# Patient Record
Sex: FEMALE
[De-identification: names, ages, dates, MRNs, and addresses within clinical notes are randomized per-mention and may not be internally consistent; named-entity substitution may affect disease eponyms.]

---

## 2018-10-16 ENCOUNTER — HOSPITAL (OUTPATIENT)
Dept: OTHER | Age: 38
End: 2018-10-16

## 2018-10-19 ENCOUNTER — HOSPITAL (OUTPATIENT)
Dept: OTHER | Age: 38
End: 2018-10-19

## 2021-07-03 ENCOUNTER — APPOINTMENT (OUTPATIENT)
Dept: GENERAL RADIOLOGY | Facility: CLINIC | Age: 41
End: 2021-07-03
Attending: EMERGENCY MEDICINE
Payer: COMMERCIAL

## 2021-07-03 ENCOUNTER — HOSPITAL ENCOUNTER (EMERGENCY)
Facility: CLINIC | Age: 41
Discharge: HOME | End: 2021-07-03
Attending: EMERGENCY MEDICINE | Admitting: EMERGENCY MEDICINE
Payer: COMMERCIAL

## 2021-07-03 ENCOUNTER — APPOINTMENT (OUTPATIENT)
Dept: CT IMAGING | Facility: CLINIC | Age: 41
End: 2021-07-03
Attending: EMERGENCY MEDICINE
Payer: COMMERCIAL

## 2021-07-03 VITALS
TEMPERATURE: 97.9 F | HEART RATE: 74 BPM | RESPIRATION RATE: 18 BRPM | DIASTOLIC BLOOD PRESSURE: 79 MMHG | OXYGEN SATURATION: 97 % | SYSTOLIC BLOOD PRESSURE: 128 MMHG

## 2021-07-03 DIAGNOSIS — R51.9 ACUTE NONINTRACTABLE HEADACHE, UNSPECIFIED HEADACHE TYPE: ICD-10-CM

## 2021-07-03 LAB
ANION GAP SERPL CALCULATED.3IONS-SCNC: 3 MMOL/L (ref 3–14)
B-HCG FREE SERPL-ACNC: <5 IU/L
BASOPHILS # BLD AUTO: 0 10E9/L (ref 0–0.2)
BASOPHILS NFR BLD AUTO: 0.4 %
BUN SERPL-MCNC: 14 MG/DL (ref 7–30)
CALCIUM SERPL-MCNC: 8.5 MG/DL (ref 8.5–10.1)
CHLORIDE SERPL-SCNC: 107 MMOL/L (ref 94–109)
CO2 SERPL-SCNC: 26 MMOL/L (ref 20–32)
CREAT SERPL-MCNC: 0.6 MG/DL (ref 0.52–1.04)
DIFFERENTIAL METHOD BLD: ABNORMAL
EOSINOPHIL # BLD AUTO: 0.1 10E9/L (ref 0–0.7)
EOSINOPHIL NFR BLD AUTO: 1.2 %
ERYTHROCYTE [DISTWIDTH] IN BLOOD BY AUTOMATED COUNT: 13.5 % (ref 10–15)
GFR SERPL CREATININE-BSD FRML MDRD: >90 ML/MIN/{1.73_M2}
GLUCOSE SERPL-MCNC: 126 MG/DL (ref 70–99)
HCT VFR BLD AUTO: 38.5 % (ref 35–47)
HGB BLD-MCNC: 11.8 G/DL (ref 11.7–15.7)
IMM GRANULOCYTES # BLD: 0 10E9/L (ref 0–0.4)
IMM GRANULOCYTES NFR BLD: 0.5 %
LYMPHOCYTES # BLD AUTO: 2.2 10E9/L (ref 0.8–5.3)
LYMPHOCYTES NFR BLD AUTO: 38.2 %
MCH RBC QN AUTO: 28.1 PG (ref 26.5–33)
MCHC RBC AUTO-ENTMCNC: 30.6 G/DL (ref 31.5–36.5)
MCV RBC AUTO: 92 FL (ref 78–100)
MONOCYTES # BLD AUTO: 0.5 10E9/L (ref 0–1.3)
MONOCYTES NFR BLD AUTO: 8.1 %
NEUTROPHILS # BLD AUTO: 3 10E9/L (ref 1.6–8.3)
NEUTROPHILS NFR BLD AUTO: 51.6 %
NRBC # BLD AUTO: 0 10*3/UL
NRBC BLD AUTO-RTO: 0 /100
PLATELET # BLD AUTO: 326 10E9/L (ref 150–450)
POTASSIUM SERPL-SCNC: 3.4 MMOL/L (ref 3.4–5.3)
RBC # BLD AUTO: 4.2 10E12/L (ref 3.8–5.2)
SODIUM SERPL-SCNC: 136 MMOL/L (ref 133–144)
TROPONIN I SERPL-MCNC: <0.015 UG/L (ref 0–0.04)
WBC # BLD AUTO: 5.7 10E9/L (ref 4–11)

## 2021-07-03 PROCEDURE — 258N000003 HC RX IP 258 OP 636: Performed by: EMERGENCY MEDICINE

## 2021-07-03 PROCEDURE — 96375 TX/PRO/DX INJ NEW DRUG ADDON: CPT

## 2021-07-03 PROCEDURE — 84484 ASSAY OF TROPONIN QUANT: CPT | Performed by: EMERGENCY MEDICINE

## 2021-07-03 PROCEDURE — 70450 CT HEAD/BRAIN W/O DYE: CPT

## 2021-07-03 PROCEDURE — 84702 CHORIONIC GONADOTROPIN TEST: CPT

## 2021-07-03 PROCEDURE — 250N000011 HC RX IP 250 OP 636: Performed by: EMERGENCY MEDICINE

## 2021-07-03 PROCEDURE — 85025 COMPLETE CBC W/AUTO DIFF WBC: CPT | Performed by: EMERGENCY MEDICINE

## 2021-07-03 PROCEDURE — 71046 X-RAY EXAM CHEST 2 VIEWS: CPT

## 2021-07-03 PROCEDURE — 93005 ELECTROCARDIOGRAM TRACING: CPT

## 2021-07-03 PROCEDURE — 96374 THER/PROPH/DIAG INJ IV PUSH: CPT

## 2021-07-03 PROCEDURE — 99285 EMERGENCY DEPT VISIT HI MDM: CPT | Mod: 25

## 2021-07-03 PROCEDURE — 80048 BASIC METABOLIC PNL TOTAL CA: CPT | Performed by: EMERGENCY MEDICINE

## 2021-07-03 PROCEDURE — 96361 HYDRATE IV INFUSION ADD-ON: CPT

## 2021-07-03 RX ORDER — DIPHENHYDRAMINE HYDROCHLORIDE 50 MG/ML
25 INJECTION INTRAMUSCULAR; INTRAVENOUS ONCE
Status: COMPLETED | OUTPATIENT
Start: 2021-07-03 | End: 2021-07-03

## 2021-07-03 RX ORDER — METOCLOPRAMIDE HYDROCHLORIDE 5 MG/ML
5 INJECTION INTRAMUSCULAR; INTRAVENOUS ONCE
Status: COMPLETED | OUTPATIENT
Start: 2021-07-03 | End: 2021-07-03

## 2021-07-03 RX ADMIN — METOCLOPRAMIDE HYDROCHLORIDE 5 MG: 5 INJECTION INTRAMUSCULAR; INTRAVENOUS at 04:17

## 2021-07-03 RX ADMIN — SODIUM CHLORIDE 1000 ML: 9 INJECTION, SOLUTION INTRAVENOUS at 04:16

## 2021-07-03 RX ADMIN — DIPHENHYDRAMINE HYDROCHLORIDE 25 MG: 50 INJECTION INTRAMUSCULAR; INTRAVENOUS at 04:16

## 2021-07-03 ASSESSMENT — ENCOUNTER SYMPTOMS
COUGH: 1
FEVER: 0
SHORTNESS OF BREATH: 1
NAUSEA: 0
DIARRHEA: 0
HEADACHES: 1
VOMITING: 0
PALPITATIONS: 1

## 2021-07-03 NOTE — ED TRIAGE NOTES
Gradual onset headache today.  No history of migraines.  Took 500 mg of tylenol 30 min PTA.  Also reports chest pressure and SOB.

## 2021-07-03 NOTE — ED PROVIDER NOTES
History   Chief Complaint:  Headache     The history is provided by the patient.      Javy Kiran is a 41 year old female who presents for evaluation of a headache. Yesterday evening, around 6 hours prior to arrival, she developed gradually worsening generalized headache. She has had similar headaches but has no history of migraines. She took 500 mg Tylenol about 4 hours prior to arrival and an addition 500 mg just before arrival. However, she continues to have 10/10 headache. When she was trying to fall asleep about 3 hours prior to arrival, she could hear her heartbeat in her ear on the left and subsequently developed shortness of breath and left upper chest pressure with palpitations, described as the sensation of a rapid heart rate. This constellation of symptoms prompted her concern but she remarks her chest pain has resolved upon evaluation in the ED. She reports an intermittent cough for several months but denies fever, nausea, vomiting, or diarrhea.     Javy admits she does not regularly drink water. She also remarks she had 5 children and does not sleep as much as she should.      Review of Systems   Constitutional: Negative for fever.   Respiratory: Positive for cough (chronic) and shortness of breath.    Cardiovascular: Positive for chest pain and palpitations.   Gastrointestinal: Negative for diarrhea, nausea and vomiting.   Neurological: Positive for headaches.   All other systems reviewed and are negative.    Allergies:  No known drug allergies      Medications:  Paragard      Past Medical History:    Hyperlipidemia    Lumbar hernia      Past Surgical History:     section   Lumbar discectomy      Family History:    Diabetes - Mother and sister   Hypertension - Mother and sister   Thyroid disease - Mother      Social History:  The patient presents to the ED accompanied by her brother.  She has five children including twin 2 year olds.      Physical Exam     Patient Vitals for the past 24  hrs:   BP Temp Temp src Pulse Resp SpO2   07/03/21 0600 -- -- -- -- -- 97 %   07/03/21 0545 128/79 -- -- 74 -- 97 %   07/03/21 0530 (!) 131/93 -- -- 88 -- 99 %   07/03/21 0515 129/89 -- -- 97 -- 98 %   07/03/21 0500 124/79 -- -- 82 -- 98 %   07/03/21 0455 -- -- -- -- -- 98 %   07/03/21 0450 -- -- -- -- -- 98 %   07/03/21 0430 -- -- -- 96 -- --   07/03/21 0425 -- -- -- 96 -- 100 %   07/03/21 0420 135/84 -- -- 100 -- --   07/03/21 0415 -- -- -- 93 -- 99 %   07/03/21 0410 -- -- -- 95 -- 98 %   07/03/21 0405 -- -- -- 90 -- 96 %   07/03/21 0400 -- -- -- 94 -- 97 %   07/03/21 0355 -- -- -- 96 -- --   07/03/21 0350 -- -- -- 108 -- --   07/03/21 0345 -- -- -- 96 -- --   07/03/21 0340 -- -- -- 95 -- --   07/03/21 0335 -- -- -- 105 -- --   07/03/21 0330 -- -- -- 105 -- 100 %   07/03/21 0325 -- -- -- -- -- 97 %   07/03/21 0320 (!) 171/114 -- -- 117 -- 100 %   07/03/21 0308 (!) 179/123 97.9  F (36.6  C) Temporal 92 18 99 %       Physical Exam  General: Well-developed and well-nourished. Uncomfortable appearing middle aged woman. Cooperative.  Head:  Atraumatic.  Eyes:  Conjunctivae, lids, and sclerae are normal.  ENT:    TMs clear bilaterally.  Neck:  Supple. Normal range of motion.  CV:  Regular rate and rhythm. Normal heart sounds with no murmurs, rubs, or gallops detected.  Resp:  No respiratory distress. Clear to auscultation bilaterally without decreased breath sounds, wheezing, rales, or rhonchi.  GI:  Soft. Non-distended. Non-tender.    MS:  Normal ROM. No bilateral lower extremity edema.  Skin:  Warm. Non-diaphoretic. No pallor.  Neuro:  Awake. A&Ox3. Normal strength including 5/5 strength with hand grasp.  Psych: Normal mood and affect. Normal speech.  Vitals reviewed.    Emergency Department Course   EKG  Indication: Chest Pain   Time: 3:27:57  Rate 107 bpm. CA interval 160 ms. QRS duration 68 ms. QT/QTc 318/424 ms.   Sinus tachycardia, Otherwise normal ECG    No acute ST changes.  No prior for  comparison.    Imaging:  XR Chest:  IMPRESSION: No evidence of active cardiopulmonary disease.   Per radiology.     CT Head w/o Contrast:  IMPRESSION:   1.  No acute intracranial process.  Per radiology.     Laboratory:   CBC: WBC 5.7, HGB 11.8,    BMP: Glucose 126 high, o/w WNL (Creatinine 0.60)   Troponin (Collected 0358): <0.015   ISTAT HCG Quantitative Pregnancy POCT: <5.0      Emergency Department Course:  Reviewed:  I reviewed nursing notes, vitals, and past medical history.    Assessments:  0325: I obtained history and examined the patient as noted above.     0457: I rechecked the patient. She was sleeping comfortably.      0615: I updated and reassessed the patient. She reported feeling lightheaded following the medication but her headache and chest pain have resolved.      Interventions:  0416 Benadryl 25 mg IV   0416 NS 1,000 mL IV   0417 Reglan 5 mg IV     Disposition:  The patient was discharged home.     Impression & Plan   Medical Decision Making:  Javy is a 41-year-old woman who had a diffuse headache starting six hours prior to arrival.  When she laid down for bed she could hear her heartbeat in her ear and then she developed shortness of breath, palpitations, and left-sided chest pain prompting her visit.  By the time of arrival she feels her chest symptoms are improved.  She appears somewhat uncomfortable, though overall well.  She has unremarkable vital signs and an unremarkable physical exam.  Her laboratory studies reveal no leukocytosis, anemia, kidney injury, or electrolyte derangements.  EKG is reassuring without acute ST changes or arrhythmias and troponin is undetectable.  I have very low suspicion for ACS in this woman.  I have some suspicion she may have had a panic attack.  Her chest x-ray does not reveal alternate cause for pain such as pneumonia or pneumothorax.  To practice with an abundance of caution I did send her for a head CT which reveals no intracranial hemorrhage or  other acute pathology.  She felt much better after IV fluids, Reglan, and Benadryl although she did feel somewhat lightheaded which is almost certainly related to these medications.  Because her chest symptoms and headache have significantly improved and her work-up here is unremarkable, she is appropriate for discharge.  I suspect her headache is related to poor oral hydration and lack of sleep and I encouraged her to increase rest and fluids.  I encouraged her to use Tylenol or ibuprofen as needed for pain at home and follow-up closely with primary care.  However, she does understand to return if she has worsening of symptoms or new concerns of any kind.  All questions answered.  Amenable to discharge.    Diagnosis:    ICD-10-CM   1. Acute nonintractable headache, unspecified headache type  R51.9        Scribe Disclosure:  I, Crow Leong, am serving as a scribe at 3:25 AM on 7/3/2021 to document services personally performed by Linn Perez MD based on my observations and the provider's statements to me.         Linn Perez MD  07/03/21 1800

## 2021-07-03 NOTE — DISCHARGE INSTRUCTIONS
Lots of fluids and rest.  Tylenol or ibuprofen as needed for pain.  Return with worsening symptoms or new concerns.  Follow up with primary care to ensure you are improving.

## 2021-07-05 LAB — INTERPRETATION ECG - MUSE: NORMAL

## 2022-12-22 ENCOUNTER — HOSPITAL ENCOUNTER (EMERGENCY)
Facility: CLINIC | Age: 42
Discharge: HOME | End: 2022-12-23
Attending: EMERGENCY MEDICINE | Admitting: EMERGENCY MEDICINE
Payer: COMMERCIAL

## 2022-12-22 ENCOUNTER — APPOINTMENT (OUTPATIENT)
Dept: CT IMAGING | Facility: CLINIC | Age: 42
End: 2022-12-22
Attending: EMERGENCY MEDICINE
Payer: COMMERCIAL

## 2022-12-22 VITALS
HEART RATE: 65 BPM | SYSTOLIC BLOOD PRESSURE: 143 MMHG | TEMPERATURE: 98 F | DIASTOLIC BLOOD PRESSURE: 85 MMHG | RESPIRATION RATE: 18 BRPM | OXYGEN SATURATION: 99 %

## 2022-12-22 DIAGNOSIS — R51.9 ACUTE INTRACTABLE HEADACHE, UNSPECIFIED HEADACHE TYPE: ICD-10-CM

## 2022-12-22 LAB
ANION GAP SERPL CALCULATED.3IONS-SCNC: 8 MMOL/L (ref 7–15)
BASOPHILS # BLD AUTO: 0 10E3/UL (ref 0–0.2)
BASOPHILS NFR BLD AUTO: 1 %
BUN SERPL-MCNC: 11.6 MG/DL (ref 6–20)
CALCIUM SERPL-MCNC: 9.2 MG/DL (ref 8.6–10)
CHLORIDE SERPL-SCNC: 103 MMOL/L (ref 98–107)
CREAT SERPL-MCNC: 0.52 MG/DL (ref 0.51–0.95)
DEPRECATED HCO3 PLAS-SCNC: 25 MMOL/L (ref 22–29)
EOSINOPHIL # BLD AUTO: 0.2 10E3/UL (ref 0–0.7)
EOSINOPHIL NFR BLD AUTO: 2 %
ERYTHROCYTE [DISTWIDTH] IN BLOOD BY AUTOMATED COUNT: 13.2 % (ref 10–15)
GFR SERPL CREATININE-BSD FRML MDRD: >90 ML/MIN/1.73M2
GLUCOSE SERPL-MCNC: 105 MG/DL (ref 70–99)
HCT VFR BLD AUTO: 40.6 % (ref 35–47)
HGB BLD-MCNC: 12.3 G/DL (ref 11.7–15.7)
IMM GRANULOCYTES # BLD: 0 10E3/UL
IMM GRANULOCYTES NFR BLD: 0 %
LYMPHOCYTES # BLD AUTO: 3 10E3/UL (ref 0.8–5.3)
LYMPHOCYTES NFR BLD AUTO: 34 %
MCH RBC QN AUTO: 28.4 PG (ref 26.5–33)
MCHC RBC AUTO-ENTMCNC: 30.3 G/DL (ref 31.5–36.5)
MCV RBC AUTO: 94 FL (ref 78–100)
MONOCYTES # BLD AUTO: 0.5 10E3/UL (ref 0–1.3)
MONOCYTES NFR BLD AUTO: 6 %
NEUTROPHILS # BLD AUTO: 5 10E3/UL (ref 1.6–8.3)
NEUTROPHILS NFR BLD AUTO: 57 %
NRBC # BLD AUTO: 0 10E3/UL
NRBC BLD AUTO-RTO: 0 /100
PLATELET # BLD AUTO: 319 10E3/UL (ref 150–450)
POTASSIUM SERPL-SCNC: 3.8 MMOL/L (ref 3.4–5.3)
RBC # BLD AUTO: 4.33 10E6/UL (ref 3.8–5.2)
SODIUM SERPL-SCNC: 136 MMOL/L (ref 136–145)
WBC # BLD AUTO: 8.8 10E3/UL (ref 4–11)

## 2022-12-22 PROCEDURE — 70450 CT HEAD/BRAIN W/O DYE: CPT

## 2022-12-22 PROCEDURE — 85025 COMPLETE CBC W/AUTO DIFF WBC: CPT | Performed by: EMERGENCY MEDICINE

## 2022-12-22 PROCEDURE — 99284 EMERGENCY DEPT VISIT MOD MDM: CPT | Mod: 25

## 2022-12-22 PROCEDURE — 80048 BASIC METABOLIC PNL TOTAL CA: CPT | Performed by: EMERGENCY MEDICINE

## 2022-12-22 PROCEDURE — 36415 COLL VENOUS BLD VENIPUNCTURE: CPT | Performed by: EMERGENCY MEDICINE

## 2022-12-22 ASSESSMENT — ACTIVITIES OF DAILY LIVING (ADL)
ADLS_ACUITY_SCORE: 35
ADLS_ACUITY_SCORE: 35

## 2022-12-23 ASSESSMENT — VISUAL ACUITY
OU: 20/30
OS: 20/50
OD: 20/30
OU: NORMAL

## 2022-12-23 ASSESSMENT — ENCOUNTER SYMPTOMS
FEVER: 0
NECK PAIN: 1
DIZZINESS: 1
HEMATURIA: 0
HEADACHES: 1
FREQUENCY: 1
DYSURIA: 0

## 2022-12-23 NOTE — ED NOTES
Rapid Assessment Note    History:   Javy Kiran is a 42 year old female who presents with bilateral blurred vision, headache, dizziness when standing, and neck pain. The patient reports that her symptoms began a week ago with right eye blurriness, which she attributes to looking at a computer. However, she notes recent left eye blurriness, neck pain, headache, and dizziness when standing, so she came into the ED. She notes headache and neck pain relief when she takes Tylenol, but reports that the pain returns in the following day. Javy also mentions an appointment with an eye doctor on 12/27. She notes regular urination frequency. The patient reports history of high blood pressure and prediabetes.    Exam:   General:  Alert, interactive  Cardiovascular:  Well perfused  Lungs:  No respiratory distress, no accessory muscle use  Neuro:  Moving all 4 extremities  Skin:  Warm, dry  Psych:  Normal affect      Plan of Care:   I evaluated the patient and developed an initial plan of care. I discussed this plan and explained that I, or one of my partners, would be returning to complete the evaluation.     Hx borderline DM; plan labs (r/o hyperglycemia; blurred vision, urinary frequency), CT head.  HA/vertigo minor at present.  Has appointment with ophthalmology 12/27 for vision changes.         I, Jeremy Jerod, am serving as a scribe to document services personally performed by Liam Boggs MD, based on my observations and the provider's statements to me.    12/22/2022  [facility]    Portions of this medical record were completed by a scribe. UPON MY REVIEW AND AUTHENTICATION BY ELECTRONIC SIGNATURE, this confirms (a) I performed the applicable clinical services, and (b) the record is accurate.            Liam Boggs MD  12/22/22 2015

## 2022-12-23 NOTE — ED PROVIDER NOTES
History   Chief Complaint:  Headache, blurred vision, and dizziness       The history is provided by the patient.      Javy Kiran is a 42 year old female with history of hypertension, prediabetes, hyperlipidemia, palpitations, among others who presents with bilateral blurred vision, headache, dizziness when standing, and neck pain. The patient reports that her symptoms began a week ago with right eye blurriness, which she attributes to looking at a computer. However, she notes recent left eye blurriness, neck pain, headache, and dizziness when standing, so she came into the ED. She notes headache and neck pain relief when she takes Tylenol, but reports that the pain returns in the following day. Javy also mentions an upcoming appointment with an eye doctor on . She notes regular urination frequency. The patient reports history of high blood pressure and prediabetes.      Review of Systems   Constitutional: Negative for fever.   HENT: Negative.    Eyes: Positive for visual disturbance (blurred).   Genitourinary: Positive for frequency. Negative for dysuria and hematuria.   Musculoskeletal: Positive for neck pain.   Skin: Negative.    Neurological: Positive for dizziness and headaches.   All other systems reviewed and are negative.      Allergies:  No Known Allergies    Medications:  Diflucan  Flagyl  Lopressor  ParaGard  Flonase  Metformin  Ferrous sulfate  Atarax  Zoloft  Zyrtec    Past Medical History:     Hypertension  Prediabetes  Palpitations  Anxiety  Depression  Obesity  Deviated nasal septum  Allergic rhinitis  Chronic pain syndrome  Lumbar hernia  Hyperlipidemia  Preeclampsia  Rectocele  Vaginal prolapse  Stress incontinence    Past Surgical History:     section  Lumbar discectomy    Family History:    Mother: diabetes, hypertension, thyroid disease  Sister(s): Diabetes, hypertension    Social History:  The patient presents to the ED alone.  The patient presents to the ED via car.  PCP: No  Ref-Primary, Physician     Physical Exam     Patient Vitals for the past 24 hrs:   BP Temp Temp src Pulse Resp SpO2   12/22/22 2329 (!) 143/85 -- -- 65 18 99 %   12/22/22 1816 (!) 163/97 98  F (36.7  C) Temporal 70 20 100 %       Physical Exam  General: Patient is alert and cooperative.  HENT:  Normal appearance.  No facial asymmetry or weakness.  Eyes: EOMI. PERRLA.  Normal conjunctivae.  No visual field cut.   Fundoscopic exam: limited; pupils were not medically dilated; no hemorrhage or grossly seen abnormality; unable to visualize optic discs.  Colleague also unable.    Neck:  Normal range of motion and appearance.   Cardiovascular:  Normal rate.   Pulmonary/Chest:  Effort normal.  Abdominal: Soft. No distension or tenderness.     Musculoskeletal: Normal range of motion. No edema or tenderness.   Neurological: oriented, normal strength, sensation, and coordination.   Skin: Warm and dry. No rash or bruising.   Psychiatric: Normal mood and affect. Normal behavior and judgement.    Emergency Department Course     Imaging:  CT Head w/o Contrast   Final Result   IMPRESSION:   1.  No CT finding of a mass, hemorrhage or focal area suggestive of acute infarct.        Report per radiology    Laboratory:  Labs Ordered and Resulted from Time of ED Arrival to Time of ED Departure   BASIC METABOLIC PANEL - Abnormal       Result Value    Sodium 136      Potassium 3.8      Chloride 103      Carbon Dioxide (CO2) 25      Anion Gap 8      Urea Nitrogen 11.6      Creatinine 0.52      Calcium 9.2      Glucose 105 (*)     GFR Estimate >90     CBC WITH PLATELETS AND DIFFERENTIAL - Abnormal    WBC Count 8.8      RBC Count 4.33      Hemoglobin 12.3      Hematocrit 40.6      MCV 94      MCH 28.4      MCHC 30.3 (*)     RDW 13.2      Platelet Count 319      % Neutrophils 57      % Lymphocytes 34      % Monocytes 6      % Eosinophils 2      % Basophils 1      % Immature Granulocytes 0      NRBCs per 100 WBC 0      Absolute Neutrophils 5.0   "    Absolute Lymphocytes 3.0      Absolute Monocytes 0.5      Absolute Eosinophils 0.2      Absolute Basophils 0.0      Absolute Immature Granulocytes 0.0      Absolute NRBCs 0.0          Emergency Department Course:  Assessments:  1950 I obtained history and examined the patient as noted above.   2355 I rechecked the patient and explained findings.   0028 I rechecked the patient and explained findings.     Disposition:  The patient was discharged to home.     Impression & Plan     Medical Decision Making:  Afebrile, well appearing 42 year old English speaking [place] female with complaints of headache, blurred vision, neck pain, urinary frequency. Hx \"prediabetes\".  No thunderclap onset.  Normal neuro exam; no visual field cut; visual acuity L 20/50, R 20/30.  Labs unremarkable, including glucose 105.  CT head neg.  Unable to visualize optic discs, but patient does have ophthalmology appointment 12/27.  Suspect benign headache, noted to her that we are unable to rule out idiopathic intracranial hypertension, but given reassuring exam, visual acuity, it seems reasonable to follow up with ophthalmology next week as scheduled.  It is after midnight with dangerous windchills and blizzard conditions; she drove to ED; either dilating pupils or transferring to [facility] for ophthalmologic exam or performing LP for opening pressure does not seem prudent at this stage; I did explain that depending on symptoms and ophthalmology exam next week, she may require further testing.     Diagnosis:    ICD-10-CM    1. Acute intractable headache, unspecified headache type  R51.9         Scribe Disclosure:  I, Jeremy Newsome, am serving as a scribe at 12:02 AM on 12/23/2022 to document services personally performed by Liam Boggs MD based on my observations and the provider's statements to me.          Liam Boggs MD  12/23/22 1156    "

## 2022-12-23 NOTE — ED TRIAGE NOTES
Headache x 1 week with blurred vision. ABC intact alert and no distress.     Triage Assessment     Row Name 12/22/22 1196       Triage Assessment (Adult)    Airway WDL WDL       Respiratory WDL    Respiratory WDL WDL       Cardiac WDL    Cardiac WDL WDL       Cognitive/Neuro/Behavioral WDL    Cognitive/Neuro/Behavioral WDL WDL

## 2023-01-22 ENCOUNTER — APPOINTMENT (OUTPATIENT)
Dept: MRI IMAGING | Facility: CLINIC | Age: 43
End: 2023-01-22
Attending: PHYSICIAN ASSISTANT
Payer: COMMERCIAL

## 2023-01-22 ENCOUNTER — HOSPITAL ENCOUNTER (EMERGENCY)
Facility: CLINIC | Age: 43
Discharge: HOME | End: 2023-01-22
Attending: PHYSICIAN ASSISTANT | Admitting: PHYSICIAN ASSISTANT
Payer: COMMERCIAL

## 2023-01-22 VITALS
DIASTOLIC BLOOD PRESSURE: 97 MMHG | RESPIRATION RATE: 18 BRPM | SYSTOLIC BLOOD PRESSURE: 132 MMHG | TEMPERATURE: 97.9 F | OXYGEN SATURATION: 99 % | HEART RATE: 72 BPM

## 2023-01-22 DIAGNOSIS — M54.42 ACUTE MIDLINE LOW BACK PAIN WITH BILATERAL SCIATICA: ICD-10-CM

## 2023-01-22 DIAGNOSIS — M54.41 ACUTE MIDLINE LOW BACK PAIN WITH BILATERAL SCIATICA: ICD-10-CM

## 2023-01-22 DIAGNOSIS — M43.16 SPONDYLOLISTHESIS OF LUMBAR REGION: ICD-10-CM

## 2023-01-22 LAB
ANION GAP SERPL CALCULATED.3IONS-SCNC: 10 MMOL/L (ref 7–15)
BASOPHILS # BLD AUTO: 0 10E3/UL (ref 0–0.2)
BASOPHILS NFR BLD AUTO: 1 %
BUN SERPL-MCNC: 11.3 MG/DL (ref 6–20)
CALCIUM SERPL-MCNC: 9.1 MG/DL (ref 8.6–10)
CHLORIDE SERPL-SCNC: 101 MMOL/L (ref 98–107)
CREAT SERPL-MCNC: 0.51 MG/DL (ref 0.51–0.95)
DEPRECATED HCO3 PLAS-SCNC: 23 MMOL/L (ref 22–29)
EOSINOPHIL # BLD AUTO: 0.1 10E3/UL (ref 0–0.7)
EOSINOPHIL NFR BLD AUTO: 2 %
ERYTHROCYTE [DISTWIDTH] IN BLOOD BY AUTOMATED COUNT: 13.4 % (ref 10–15)
GFR SERPL CREATININE-BSD FRML MDRD: >90 ML/MIN/1.73M2
GLUCOSE SERPL-MCNC: 109 MG/DL (ref 70–99)
HCG SER QL IA.RAPID: NEGATIVE
HCT VFR BLD AUTO: 41.9 % (ref 35–47)
HGB BLD-MCNC: 12.8 G/DL (ref 11.7–15.7)
IMM GRANULOCYTES # BLD: 0 10E3/UL
IMM GRANULOCYTES NFR BLD: 0 %
LYMPHOCYTES # BLD AUTO: 2.4 10E3/UL (ref 0.8–5.3)
LYMPHOCYTES NFR BLD AUTO: 42 %
MCH RBC QN AUTO: 28.4 PG (ref 26.5–33)
MCHC RBC AUTO-ENTMCNC: 30.5 G/DL (ref 31.5–36.5)
MCV RBC AUTO: 93 FL (ref 78–100)
MONOCYTES # BLD AUTO: 0.3 10E3/UL (ref 0–1.3)
MONOCYTES NFR BLD AUTO: 6 %
NEUTROPHILS # BLD AUTO: 2.8 10E3/UL (ref 1.6–8.3)
NEUTROPHILS NFR BLD AUTO: 49 %
NRBC # BLD AUTO: 0 10E3/UL
NRBC BLD AUTO-RTO: 0 /100
PLATELET # BLD AUTO: 302 10E3/UL (ref 150–450)
POTASSIUM SERPL-SCNC: 4.2 MMOL/L (ref 3.4–5.3)
RBC # BLD AUTO: 4.5 10E6/UL (ref 3.8–5.2)
SODIUM SERPL-SCNC: 134 MMOL/L (ref 136–145)
WBC # BLD AUTO: 5.7 10E3/UL (ref 4–11)

## 2023-01-22 PROCEDURE — 85025 COMPLETE CBC W/AUTO DIFF WBC: CPT | Performed by: PHYSICIAN ASSISTANT

## 2023-01-22 PROCEDURE — A9585 GADOBUTROL INJECTION: HCPCS | Performed by: PHYSICIAN ASSISTANT

## 2023-01-22 PROCEDURE — 250N000013 HC RX MED GY IP 250 OP 250 PS 637: Performed by: PHYSICIAN ASSISTANT

## 2023-01-22 PROCEDURE — 96374 THER/PROPH/DIAG INJ IV PUSH: CPT | Mod: 59

## 2023-01-22 PROCEDURE — 36415 COLL VENOUS BLD VENIPUNCTURE: CPT | Performed by: PHYSICIAN ASSISTANT

## 2023-01-22 PROCEDURE — 96375 TX/PRO/DX INJ NEW DRUG ADDON: CPT | Mod: 59

## 2023-01-22 PROCEDURE — 250N000011 HC RX IP 250 OP 636: Performed by: PHYSICIAN ASSISTANT

## 2023-01-22 PROCEDURE — 255N000002 HC RX 255 OP 636: Performed by: PHYSICIAN ASSISTANT

## 2023-01-22 PROCEDURE — 84703 CHORIONIC GONADOTROPIN ASSAY: CPT

## 2023-01-22 PROCEDURE — 99285 EMERGENCY DEPT VISIT HI MDM: CPT | Mod: 25

## 2023-01-22 PROCEDURE — 80048 BASIC METABOLIC PNL TOTAL CA: CPT | Performed by: PHYSICIAN ASSISTANT

## 2023-01-22 PROCEDURE — 72158 MRI LUMBAR SPINE W/O & W/DYE: CPT

## 2023-01-22 RX ORDER — DEXAMETHASONE SODIUM PHOSPHATE 10 MG/ML
10 INJECTION, SOLUTION INTRAMUSCULAR; INTRAVENOUS ONCE
Status: COMPLETED | OUTPATIENT
Start: 2023-01-22 | End: 2023-01-22

## 2023-01-22 RX ORDER — GADOBUTROL 604.72 MG/ML
9 INJECTION INTRAVENOUS ONCE
Status: COMPLETED | OUTPATIENT
Start: 2023-01-22 | End: 2023-01-22

## 2023-01-22 RX ORDER — KETOROLAC TROMETHAMINE 15 MG/ML
15 INJECTION, SOLUTION INTRAMUSCULAR; INTRAVENOUS ONCE
Status: COMPLETED | OUTPATIENT
Start: 2023-01-22 | End: 2023-01-22

## 2023-01-22 RX ORDER — CYCLOBENZAPRINE HCL 10 MG
10 TABLET ORAL ONCE
Status: COMPLETED | OUTPATIENT
Start: 2023-01-22 | End: 2023-01-22

## 2023-01-22 RX ORDER — METHYLPREDNISOLONE 4 MG
TABLET, DOSE PACK ORAL
Qty: 21 TABLET | Refills: 0 | Status: SHIPPED | OUTPATIENT
Start: 2023-01-22 | End: 2024-01-17

## 2023-01-22 RX ORDER — LIDOCAINE 4 G/G
1 PATCH TOPICAL EVERY 24 HOURS
Qty: 5 PATCH | Refills: 0 | Status: SHIPPED | OUTPATIENT
Start: 2023-01-22 | End: 2023-01-27

## 2023-01-22 RX ORDER — LIDOCAINE 4 G/G
1 PATCH TOPICAL ONCE
Status: DISCONTINUED | OUTPATIENT
Start: 2023-01-22 | End: 2023-01-22 | Stop reason: HOSPADM

## 2023-01-22 RX ORDER — CYCLOBENZAPRINE HCL 10 MG
10 TABLET ORAL 2 TIMES DAILY PRN
Qty: 12 TABLET | Refills: 0 | Status: SHIPPED | OUTPATIENT
Start: 2023-01-22 | End: 2023-01-28

## 2023-01-22 RX ADMIN — LIDOCAINE 1 PATCH: 246 PATCH TOPICAL at 11:06

## 2023-01-22 RX ADMIN — GADOBUTROL 9 ML: 604.72 INJECTION INTRAVENOUS at 12:22

## 2023-01-22 RX ADMIN — KETOROLAC TROMETHAMINE 15 MG: 15 INJECTION, SOLUTION INTRAMUSCULAR; INTRAVENOUS at 11:51

## 2023-01-22 RX ADMIN — CYCLOBENZAPRINE HYDROCHLORIDE 10 MG: 10 TABLET, FILM COATED ORAL at 11:08

## 2023-01-22 RX ADMIN — DEXAMETHASONE SODIUM PHOSPHATE 10 MG: 10 INJECTION, SOLUTION INTRAMUSCULAR; INTRAVENOUS at 11:52

## 2023-01-22 ASSESSMENT — ACTIVITIES OF DAILY LIVING (ADL)
ADLS_ACUITY_SCORE: 33
ADLS_ACUITY_SCORE: 35

## 2023-01-22 NOTE — ED PROVIDER NOTES
History     Chief Complaint:  Back Pain       HPI   Javy Kiran is a 42 year old female with history of lumbar discectomy in 2019 who presents for back pain and numbness in the legs.  The patient notes that this morning she was bending down to pick something up when she had sudden onset of pain in her low back and bilateral tingling and weakness in her legs.  She notes that she has had difficulty walking due to her legs feeling weak as well as the pain since then and her  had to help her get into the car and she came straight here.  She denies any bowel or bladder incontinence or retention or numbness in the groin or rectal area.  She has not had any fever chills does not use IV drugs and is prediabetic but no other immune system compromise or issues.  No dysuria or hematuria.  She denies any recent falls or direct injuries.  She is not on blood thinners and has not had any recent injections or surgeries since 2019.  No history of cancer or unusual weight loss or night sweats.  Denies abdominal pain but the pain does shoot around into the front and groin area.  She feels that whenever she tries to move at all.  Patient has not taken any medications this morning for her symptoms.  She denies any chance of pregnancy.      Independent Historian: CAROLE     Review of External Notes: I reviewed Dr. Wisam Putnam's primary care note regarding patient's chronic pain.  I note that her chronic pain usually presents as headaches and sinus pain does not usually present for back pain issues.    ROS:  Review of Systems   All other systems reviewed and are negative.    Allergies:  No Known Allergies     Medications:    Tylenol  Zyrtec  Metformin  Metoprolol  Sertraline    Past Medical History:    Allergic rhinitis  Chronic pain syndrome  Anxiety  Hypertension  Lumbar disc herniation      Past Surgical History:    Lumbar discectomy 2019.    Social History:  No IV drug use    PCP: No Ref-Primary, Physician     Physical  Exam     Patient Vitals for the past 24 hrs:   BP Temp Temp src Pulse Resp SpO2   01/22/23 1426 -- -- -- -- -- 99 %   01/22/23 1425 (!) 132/97 -- -- 72 -- --   01/22/23 1007 (!) 151/105 97.9  F (36.6  C) Temporal 69 18 98 %        Physical Exam  General: Awake, alert, non-toxic. Sitting in wheelchair  Head:  Scalp is NC/AT  Eyes:  Conjunctiva normal, PERRL  ENT:  The external nose and ears are normal.   Neck:  Normal range of motion without rigidity.  CV:  Regular rate and rhythm    No pathologic murmur, rubs, or gallops.  Resp:  Breath sounds are clear bilaterally    Non-labored, no retractions or accessory muscle use  Abdomen: Abdomen is soft, no distension, no tenderness, no masses. No CVA tenderness.  MS:  No lower extremity edema or asymmetric calf swelling.  Midline mid to lower lumbar tenderness to palpation as well as paraspinal tenderness.  Reproducible pain w/positional change.  No swelling step-offs or deformities.  No midline cervical or thoracic tenderness. PROM of other joints without pain.  Skin:  Warm and dry, No rash or lesions noted.  Neuro: Alert and oriented.  GCS 15 4/5 strength bilaterally at hips and knees (question due to pain).  5/5 strength bilaterally at ankles and toes.  Objectively normal sensation in all major dermatomes.  No facial asymmetry. Gait normal on re-check  Psych:  Awake. Alert. Normal affect. Appropriate interactions.    Emergency Department Course   Imaging:  Lumbar spine MRI w & w/o contrast - surgery <10yrs   Final Result   IMPRESSION:   1.  Multilevel spondylotic change superimposed on congenital narrowing of the spinal canal contributing to mild spinal canal stenosis at L3-L5.   2.  Mild bilateral foraminal narrowing at L5-S1.         Report per radiology    Laboratory:  Labs Ordered and Resulted from Time of ED Arrival to Time of ED Departure   BASIC METABOLIC PANEL - Abnormal       Result Value    Sodium 134 (*)     Potassium 4.2      Chloride 101      Carbon Dioxide  (CO2) 23      Anion Gap 10      Urea Nitrogen 11.3      Creatinine 0.51      Calcium 9.1      Glucose 109 (*)     GFR Estimate >90     CBC WITH PLATELETS AND DIFFERENTIAL - Abnormal    WBC Count 5.7      RBC Count 4.50      Hemoglobin 12.8      Hematocrit 41.9      MCV 93      MCH 28.4      MCHC 30.5 (*)     RDW 13.4      Platelet Count 302      % Neutrophils 49      % Lymphocytes 42      % Monocytes 6      % Eosinophils 2      % Basophils 1      % Immature Granulocytes 0      NRBCs per 100 WBC 0      Absolute Neutrophils 2.8      Absolute Lymphocytes 2.4      Absolute Monocytes 0.3      Absolute Eosinophils 0.1      Absolute Basophils 0.0      Absolute Immature Granulocytes 0.0      Absolute NRBCs 0.0     ISTAT HCG QUALITATIVE PREGNANCY POCT - Normal    HCG Qualitative POCT Negative          Emergency Department Course & Assessments:  Interventions:  Medications   Lidocaine (LIDOCARE) 4 % Patch 1 patch (1 patch Transdermal Patch/Med Removed 23 1211)   ketorolac (TORADOL) injection 15 mg (15 mg Intravenous Given 23 1151)   dexamethasone PF (DECADRON) injection 10 mg (10 mg Intravenous Given 23 1152)   cyclobenzaprine (FLEXERIL) tablet 10 mg (10 mg Oral Given 23 1108)   gadobutrol (GADAVIST) injection 9 mL (9 mLs Intravenous Given 23 1222)        Independent Interpretation (X-rays, CTs, rhythm strip):  I reviewed MRI images see no evidence of fracture or definite signal abnormality or mass.    Social Determinants of Health affecting care:  . Does not use IV drugs.     Disposition:  The patient was discharged to home.     Impression & Plan    CMS Diagnoses: None    Medical Decision Makin-year-old female with history of prior lumbar discectomy presents for sudden onset of low back pain radiating to the bilateral legs with subjective weakness this morning after bending down.  Broad differential considered.  Work-up here is very reassuring.  MRI with and without contrast of the  lumbar spine shows no evidence of cauda equina, conus medullaris, cord compression, fracture, malignancy, or spinal infection.  Does show some spondylolisthesis and slight stenosis but nothing which would be of concern or require emergent neurosurgical consult or admission.  No evidence of abdominal pain/tenderness or urinary symptoms and doubt referred pain from AAA, vascular, intra-abdominal, or  cause.  Labs are reassuring with normal white count, kidney function, she is not pregnant.    Patient's symptoms are consistent with muscular cause with radiculopathy/sciatica.  Felt much better after symptomatic treatment here.  Plan will be muscle relaxers for home with sedation precautions, steroids, symptomatic meds.  Follow-up with PCP and her prior spinal surgeon if not improving.  She was able to ambulate without significant difficulty.  All questions answered and return precautions given.        Critical Care time:  was 0 minutes for this patient excluding procedures.    Diagnosis:    ICD-10-CM    1. Acute midline low back pain with bilateral sciatica  M54.42     M54.41       2. Spondylolisthesis of lumbar region  M43.16            Discharge Medications:  Discharge Medication List as of 1/22/2023  2:28 PM      START taking these medications    Details   cyclobenzaprine (FLEXERIL) 10 MG tablet Take 1 tablet (10 mg) by mouth 2 times daily as needed for other (Pain), Disp-12 tablet, R-0, E-Prescribe      Lidocaine (LIDOCARE) 4 % Patch Place 1 patch onto the skin every 24 hours for 5 days To prevent lidocaine toxicity, patient should be patch free for 12 hrs daily.Disp-5 patch, [id number]      methylPREDNISolone (MEDROL DOSEPAK) 4 MG tablet therapy pack Follow Package Directions, Disp-21 tablet, R-0, E-Prescribe              Scribe Disclosure:  Dilshad NUNES PA-C, am serving as a scribe at 10:30 AM on 1/22/2023 to document services personally performed by Dilshad Esparza,  based on my observations  and the provider's statements to me.     1/22/2023   Dilshad Esparza,          Dilshad Esparza PA-C  01/22/23 6790

## 2023-01-22 NOTE — ED TRIAGE NOTES
A&O x4, ABCs intact. Pt presents with EMS for lower back pain that started today when she bent over. EMS reports pt has surgical hx on back. Pt reports tingling in bilateral legs.        Triage Assessment     Row Name 01/22/23 1006       Triage Assessment (Adult)    Airway WDL WDL       Respiratory WDL    Respiratory WDL WDL       Cardiac WDL    Cardiac WDL WDL

## 2023-05-06 ENCOUNTER — HOSPITAL ENCOUNTER (EMERGENCY)
Facility: CLINIC | Age: 43
Discharge: HOME | End: 2023-05-06
Attending: PHYSICIAN ASSISTANT | Admitting: PHYSICIAN ASSISTANT
Payer: COMMERCIAL

## 2023-05-06 VITALS
TEMPERATURE: 98.4 F | HEART RATE: 71 BPM | DIASTOLIC BLOOD PRESSURE: 80 MMHG | SYSTOLIC BLOOD PRESSURE: 118 MMHG | RESPIRATION RATE: 16 BRPM | OXYGEN SATURATION: 100 %

## 2023-05-06 DIAGNOSIS — R07.89 ATYPICAL CHEST PAIN: ICD-10-CM

## 2023-05-06 DIAGNOSIS — M25.512 LEFT SHOULDER PAIN: ICD-10-CM

## 2023-05-06 PROCEDURE — 99283 EMERGENCY DEPT VISIT LOW MDM: CPT

## 2023-05-06 PROCEDURE — 93005 ELECTROCARDIOGRAM TRACING: CPT

## 2023-05-06 ASSESSMENT — ENCOUNTER SYMPTOMS: VOMITING: 1

## 2023-05-06 NOTE — ED PROVIDER NOTES
History     Chief Complaint:   Chest and shoulder pain.    LYNNETTE Kiran is a 42 year old female with a history of DM type II who presents with chest pain. The patient has had left shoulder pain for the past month, and left sided chest pain that started after 3-4 days. She has shortness of breath with exertion up the stairs. She denies fever or cough. She has had vomiting. She denies leg swelling, history of DVT/PE, birth control, or smoking. She denies recent history of surgery or hospitalization.    Review of External Notes: none    ROS:  Review of Systems   Cardiovascular: Positive for chest pain. Negative for leg swelling.   Gastrointestinal: Positive for vomiting.   Musculoskeletal:        Shoulder pain   All other systems reviewed and are negative.    Allergies:  No Known Allergies     Medications:    Methylprednisolone  Metoprolol  flonase  Lancets  Blood glucose test strips  victoza  Omeprazole    Past Medical History:    DM type II  Anxiety  Depression  Anxiety  Hypertension  Obesity  Hyperlipidemia  Prediabetes  Preeclampsia    Past Surgical History:      Probe, percutaneous lumbar discectomy    Social History:  Presents alone  Physical Exam     Patient Vitals for the past 24 hrs:   BP Temp Pulse Resp SpO2   23 1345 118/80 -- 71 16 100 %   23 1253 (!) 176/114 98.4  F (36.9  C) 75 16 98 %      Physical Exam  General: Awake, alert, non-toxic.  Head:  Scalp is NC/AT  Eyes:  Conjunctiva normal, PERRL  ENT:  The external nose and ears are normal.     Oropharynx clear, uvula midline.  Neck:  Normal range of motion without rigidity.  CV:  Regular rate and rhythm    No pathologic murmur, rubs, or gallops.  Resp:  Breath sounds are clear bilaterally    Non-labored, no retractions or accessory muscle use  Abdomen: Abdomen is soft, no distension, no tenderness, no masses  MS:  No lower extremity edema/swelling. No midline cervical, thoracic, or lumbar tenderness.  Pain with range of  motion of left shoulder but no redness swelling or deformity.  No edema to the left upper extremity.  Reproducible tenderness to left anterior chest wall.  Skin:  Warm and dry, No rash or lesions noted.  2+ radial pulses bilaterally with cap refill less than 2 seconds.  Neuro: Alert and oriented.  GCS 15 Moves all extremities normal.  Normal radian, medial, ulnar nerve strength bilaterally in hands.  Grossly normal sensation to touch in the arms.  No facial asymmetry. Gait normal.  Psych:  Awake. Alert. Normal affect. Appropriate interactions.      Emergency Department Course     ECG results from 05/06/23   EKG 12 lead     Value    Systolic Blood Pressure     Diastolic Blood Pressure     Ventricular Rate 68    Atrial Rate 68    NH Interval 150    QRS Duration 70        QTc 399    P Axis 65    R AXIS -3    T Axis 16    Interpretation ECG      Sinus rhythm with sinus arrhythmia  Normal ECG  When compared with ECG of 03-JUL-2021 03:27,  Vent. rate has decreased BY  39 BPM         Imaging:  NoneReport per radiology    Emergency Department Course & Assessments:    Independent Interpretation (X-rays, CTs, rhythm strip):  none    Assessments:  1339 I obtained history and examined the patient.  1427 I rechecked the patient.    Disposition:  The patient left AMA.     Impression & Plan    Medical Decision Making:  This is a 42-year-old female with history of type 2 diabetes who presents with some left shoulder pain for the last month as well as left-sided chest pain.  A broad differential was considered.  Patient is well-appearing and vitally stable.  Her EKG is normal.      After finishing evaluating the patient and examining her we discussed work-up including blood work and x-rays however several minutes later I was informed by nursing that the patient was requesting to leave right away as she had to go home for something and did not wish to stay for further work-up.  I had a lengthy discussion with her explaining  that I felt further evaluation was indicated to exclude emergent processes including but not limited to acute coronary syndrome.  I discussed that while her pain does appear most likely muscular in nature I was unable to exclude these without further work-up.  She expressed an understanding of this as well as the potential risk of death permanent disability or serious injury if these diagnoses were missed or not diagnosed in a timely manner.  She continues to request to be discharged home.  I think she is has the capacity to make this decision.  I did advise her to follow-up closely with her PCP as well as Ortho and to return at any time if she changes her mind and would like to be further evaluated or has any new worsening or changing symptoms.    Diagnosis:    ICD-10-CM    1. Left shoulder pain  M25.512       2. Atypical chest pain  R07.89            Discharge Medications:  Discharge Medication List as of 5/6/2023  2:31 PM         Scribe Disclosure:  Tico NUNES Hired, am serving as a scribe at 1:09 PM on 5/6/2023 to document services personally performed by Dilshad Esparza PA-C based on my observations and the provider's statements to me.    5/6/2023   Dilshad Esparza PA-C Etten, Clark Ellsworth, PA-C  05/06/23 [id number]

## 2023-05-06 NOTE — DISCHARGE INSTRUCTIONS
You are choosing to leave today AGAINST MEDICAL ADVICE.  Your medical workup and treatment are not yet completed and I do not feel it's safe for you to leave the emergency department at this time.  Delay in diagnosis or treatment of your condition could result in serious injury, irreversible disability, or death.  You are welcome to return at any time if your symptoms worsen, or if you change your mind and would like to undergo further evaluation and treatment.  We will be happy to see you again and to care for you.  Discharge Instructions  Chest Pain    You have been seen today for chest pain or discomfort.  At this time, your provider has found no signs that your chest pain is due to a serious or life-threatening condition, (or you have declined more testing and/or admission to the hospital). However, sometimes there is a serious problem that does not show up right away. Your evaluation today may not be complete and you may need further testing and evaluation.     Generally, every Emergency Department visit should have a follow-up clinic visit with either a primary or a specialty clinic/provider. Please follow-up as instructed by your emergency provider today.  Return to the Emergency Department if:  Your chest pain changes, gets worse, starts to happen more often, or comes with less activity.  You are newly short of breath.  You get very weak or tired.  You pass out or faint.  You have any new symptoms, like fever, cough, numb legs, or you cough up blood.  You have anything else that worries you.    Until you follow-up with your regular provider, please do the following:  Take one aspirin daily unless you have an allergy or are told not to by your provider.  If a stress test appointment has been made, go to the appointment.  If you have questions, contact your regular provider.  Follow-up with your regular provider/clinic as directed; this is very important.    If you were given a prescription for medicine here  today, be sure to read all of the information (including the package insert) that comes with your prescription.  This will include important information about the medicine, its side effects, and any warnings that you need to know about.  The pharmacist who fills the prescription can provide more information and answer questions you may have about the medicine.  If you have questions or concerns that the pharmacist cannot address, please call or return to the Emergency Department.       Remember that you can always come back to the Emergency Department if you are not able to see your regular provider in the amount of time listed above, if you get any new symptoms, or if there is anything that worries you.

## 2023-05-06 NOTE — ED TRIAGE NOTES
Pt presents to ED with c/o left arm, shoulder x1 month. Pt states that pain is constant and worse when she moves her arm. The pain started radiating into her chest for the last 3-4 days which is why she came in.

## 2023-05-08 LAB
ATRIAL RATE - MUSE: 68 BPM
DIASTOLIC BLOOD PRESSURE - MUSE: NORMAL MMHG
INTERPRETATION ECG - MUSE: NORMAL
P AXIS - MUSE: 65 DEGREES
PR INTERVAL - MUSE: 150 MS
QRS DURATION - MUSE: 70 MS
QT - MUSE: 376 MS
QTC - MUSE: 399 MS
R AXIS - MUSE: -3 DEGREES
SYSTOLIC BLOOD PRESSURE - MUSE: NORMAL MMHG
T AXIS - MUSE: 16 DEGREES
VENTRICULAR RATE- MUSE: 68 BPM

## 2023-08-18 ENCOUNTER — TRANSFERRED RECORDS (OUTPATIENT)
Dept: MULTI SPECIALTY CLINIC | Facility: CLINIC | Age: 43
End: 2023-08-18

## 2023-08-18 LAB
HPV ABSTRACT: NORMAL
PAP-ABSTRACT: NORMAL

## 2024-01-17 ENCOUNTER — OFFICE VISIT (OUTPATIENT)
Dept: FAMILY MEDICINE | Facility: CLINIC | Age: 44
End: 2024-01-17
Payer: MEDICAID

## 2024-01-17 VITALS
TEMPERATURE: 97.9 F | OXYGEN SATURATION: 99 % | WEIGHT: 189.4 LBS | BODY MASS INDEX: 34.85 KG/M2 | HEIGHT: 62 IN | SYSTOLIC BLOOD PRESSURE: 126 MMHG | RESPIRATION RATE: 20 BRPM | HEART RATE: 91 BPM | DIASTOLIC BLOOD PRESSURE: 83 MMHG

## 2024-01-17 DIAGNOSIS — I10 ESSENTIAL HYPERTENSION: ICD-10-CM

## 2024-01-17 DIAGNOSIS — E66.01 CLASS 2 SEVERE OBESITY DUE TO EXCESS CALORIES WITH SERIOUS COMORBIDITY AND BODY MASS INDEX (BMI) OF 35.0 TO 35.9 IN ADULT (H): ICD-10-CM

## 2024-01-17 DIAGNOSIS — Z79.4 TYPE 2 DIABETES MELLITUS WITH DIABETIC MONONEUROPATHY, WITH LONG-TERM CURRENT USE OF INSULIN (H): Primary | ICD-10-CM

## 2024-01-17 DIAGNOSIS — E11.41 TYPE 2 DIABETES MELLITUS WITH DIABETIC MONONEUROPATHY, WITH LONG-TERM CURRENT USE OF INSULIN (H): Primary | ICD-10-CM

## 2024-01-17 DIAGNOSIS — E66.812 CLASS 2 SEVERE OBESITY DUE TO EXCESS CALORIES WITH SERIOUS COMORBIDITY AND BODY MASS INDEX (BMI) OF 35.0 TO 35.9 IN ADULT (H): ICD-10-CM

## 2024-01-17 LAB
ALBUMIN UR-MCNC: NEGATIVE MG/DL
APPEARANCE UR: CLEAR
BACTERIA #/AREA URNS HPF: ABNORMAL /HPF
BILIRUB UR QL STRIP: NEGATIVE
COLOR UR AUTO: YELLOW
GLUCOSE UR STRIP-MCNC: NEGATIVE MG/DL
HBA1C MFR BLD: 6 % (ref 0–5.6)
HGB UR QL STRIP: NEGATIVE
KETONES UR STRIP-MCNC: 15 MG/DL
LEUKOCYTE ESTERASE UR QL STRIP: ABNORMAL
NITRATE UR QL: NEGATIVE
PH UR STRIP: 7 [PH] (ref 5–7)
RBC #/AREA URNS AUTO: ABNORMAL /HPF
SP GR UR STRIP: 1.01 (ref 1–1.03)
SQUAMOUS #/AREA URNS AUTO: ABNORMAL /LPF
UROBILINOGEN UR STRIP-ACNC: 0.2 E.U./DL
WBC #/AREA URNS AUTO: ABNORMAL /HPF

## 2024-01-17 PROCEDURE — 83036 HEMOGLOBIN GLYCOSYLATED A1C: CPT | Performed by: NURSE PRACTITIONER

## 2024-01-17 PROCEDURE — 99203 OFFICE O/P NEW LOW 30 MIN: CPT | Performed by: NURSE PRACTITIONER

## 2024-01-17 PROCEDURE — 81001 URINALYSIS AUTO W/SCOPE: CPT | Performed by: NURSE PRACTITIONER

## 2024-01-17 PROCEDURE — 36415 COLL VENOUS BLD VENIPUNCTURE: CPT | Performed by: NURSE PRACTITIONER

## 2024-01-17 RX ORDER — METOPROLOL TARTRATE 25 MG/1
25 TABLET, FILM COATED ORAL DAILY
COMMUNITY
End: 2024-01-17

## 2024-01-17 RX ORDER — FLUTICASONE PROPIONATE 50 MCG
1 SPRAY, SUSPENSION (ML) NASAL
COMMUNITY
Start: 2023-05-12

## 2024-01-17 RX ORDER — LIRAGLUTIDE 6 MG/ML
0.6 INJECTION SUBCUTANEOUS
COMMUNITY
Start: 2023-08-12 | End: 2024-05-08

## 2024-01-17 RX ORDER — GLUCOSAMINE HCL/CHONDROITIN SU 500-400 MG
CAPSULE ORAL
Qty: 100 EACH | Refills: 3 | Status: SHIPPED | OUTPATIENT
Start: 2024-01-17

## 2024-01-17 RX ORDER — BLOOD-GLUCOSE CONTROL, NORMAL
EACH MISCELLANEOUS
Qty: 1 EACH | Refills: 3 | Status: SHIPPED | OUTPATIENT
Start: 2024-01-17

## 2024-01-17 RX ORDER — METOPROLOL TARTRATE 25 MG/1
25 TABLET, FILM COATED ORAL DAILY
Qty: 30 TABLET | Refills: 3 | Status: SHIPPED | OUTPATIENT
Start: 2024-01-17 | End: 2024-07-12

## 2024-01-17 RX ORDER — BLOOD SUGAR DIAGNOSTIC
STRIP MISCELLANEOUS
Qty: 100 STRIP | Refills: 6 | Status: SHIPPED | OUTPATIENT
Start: 2024-01-17

## 2024-01-17 RX ORDER — LANCETS
EACH MISCELLANEOUS
Qty: 102 EACH | Refills: 6 | Status: SHIPPED | OUTPATIENT
Start: 2024-01-17

## 2024-01-17 ASSESSMENT — PAIN SCALES - GENERAL: PAINLEVEL: NO PAIN (0)

## 2024-01-17 NOTE — Clinical Note
Tests that can be patient reported without a hard copy:  Pap smear done on this date: 11/11/2020 (approximately), by this group: [facility], results were negative

## 2024-01-17 NOTE — PROGRESS NOTES
"  Assessment & Plan     (E11.41,  Z79.4) Type 2 diabetes mellitus with diabetic mononeuropathy, with long-term current use of insulin (H)  (primary encounter diagnosis)  Comment: Controlled  Plan: REVIEW OF HEALTH MAINTENANCE PROTOCOL ORDERS,         Lipid panel reflex to direct LDL Non-fasting,         Hemoglobin A1c, UA Macroscopic with reflex to         Microscopic and Culture - Lab Collect, UA         Microscopic with Reflex to Culture, blood         glucose (ACCU-CHEK SMARTVIEW) test strip, blood        glucose calibration (ACCU-CHEK SMARTVIEW         CONTROL) solution, blood glucose monitoring         (ACCU-CHEK FASTCLIX) lancets, alcohol swab prep        pads  The current medical regimen is effective;  continue present plan and medications.     (I10) Essential hypertension  Comment: stable  Plan: REVIEW OF HEALTH MAINTENANCE PROTOCOL ORDERS,         metoprolol tartrate (LOPRESSOR) 25 MG tablet  -The current medical regimen is effective;  continue present plan and medications.     (E66.01,  Z68.35) Class 2 severe obesity due to excess calories with serious comorbidity and body mass index (BMI) of 35.0 to 35.9 in adult (H)  Plan:   - avoid sugary drinks / juice.   - Include at least 2 cups of vegetables most days.  - Eat mostly unprocessed grains / whole grains. No more than 2 cups per day.   - Choose lean proteins.   - Do at least 150 minutes a week of physical activity.               BMI  Estimated body mass index is 35.21 kg/m  as calculated from the following:    Height as of this encounter: 1.562 m (5' 1.5\").    Weight as of this encounter: 85.9 kg (189 lb 6.4 oz).           Davian Palafox is a 43 year old, presenting for the following health issues:  Establish Care, Hypertension, and Diabetes        1/17/2024     3:40 PM   Additional Questions   Roomed by sha         1/17/2024     3:40 PM   Patient Reported Additional Medications   Patient reports taking the following new medications none     Via the " "Health Maintenance questionnaire, the patient has reported the following services have been completed -Mammogram-Cervical Cancer Screening, this information has been sent to the abstraction team.  History of Present Illness       Reason for visit:  Establish care    She eats 0-1 servings of fruits and vegetables daily.She consumes 0 sweetened beverage(s) daily.She exercises with enough effort to increase her heart rate 9 or less minutes per day.  She exercises with enough effort to increase her heart rate 3 or less days per week. She is missing 2 dose(s) of medications per week.     Javy Kiran is 43 year old female with a history of hypertension, and diabetes mellitus who is her to establish care.    Diabetes Follow-up    How often are you checking your blood sugar? Not at all  What concerns do you have today about your diabetes? Other: A1C   Do you have any of these symptoms? (Select all that apply)  No numbness or tingling in feet.  No redness, sores or blisters on feet.  No complaints of excessive thirst.  No reports of blurry vision.  No significant changes to weight.      BP Readings from Last 2 Encounters:   01/17/24 126/83   05/06/23 118/80     No results found for: \"A1C\", \"LDL\"          Hypertension Follow-up    Do you check your blood pressure regularly outside of the clinic? Yes   Are you following a low salt diet? No  Are your blood pressures ever more than 140 on the top number (systolic) OR more   than 90 on the bottom number (diastolic), for example 140/90? No        Pt declined colonoscopy.  Objective    /83 (BP Location: Left arm, Patient Position: Sitting, Cuff Size: Adult Large)   Pulse 91   Temp 97.9  F (36.6  C) (Tympanic)   Resp 20   Ht 1.562 m (5' 1.5\")   Wt 85.9 kg (189 lb 6.4 oz)   LMP 12/24/2023 (Approximate)   SpO2 99%   BMI 35.21 kg/m    Body mass index is 35.21 kg/m .    Review of Systems   ROS: 10 point ROS neg other than the symptoms noted above in the HPI.    Physical " Exam   GENERAL: alert and no distress  EYES: Eyes grossly normal to inspection, PERRL and conjunctivae and sclerae normal  HENT: ear canals and TM's normal, nose and mouth without ulcers or lesions  NECK: no adenopathy, no asymmetry, masses, or scars  RESP: lungs clear to auscultation - no rales, rhonchi or wheezes  CV: regular rate and rhythm, normal S1 S2, no S3 or S4, no murmur, click or rub, no peripheral edema  ABDOMEN: soft, nontender, no hepatosplenomegaly, no masses and bowel sounds normal  MS: no gross musculoskeletal defects noted, no edema  SKIN: no suspicious lesions or rashes  NEURO: Normal strength and tone, mentation intact and speech normal  PSYCH: mentation appears normal, affect normal/bright  LYMPH: no cervical, supraclavicular, axillary, or inguinal adenopathy  Diabetic foot exam: normal DP and PT pulses, no trophic changes or ulcerative lesions, and normal sensory exam            Signed Electronically by: LANNY Renee CNP        Please abstract the following data from this visit with this patient into the appropriate field in Epic:    Tests that can be patient reported without a hard copy:    Pap smear done on this date: 11/11/2020 (approximately), by this group: [facility], results were negative    Mammogram 8/18/23    Other Tests found in the patient's chart through Chart Review/Care Everywhere:    Note reviewed only.Patient was not seen By me.  Daphney Coulter MD            Note to Abstraction: If this section is blank, no results were found via Chart Review/Care Everywhere.

## 2024-01-17 NOTE — Clinical Note
Tests that can be patient reported without a hard copy:  Pap smear done on this date: 11/11/2020 (approximately), by this group: [facility], results were negative   Mammogram 8/18/23

## 2024-02-29 DIAGNOSIS — J30.2 SEASONAL ALLERGIC RHINITIS, UNSPECIFIED TRIGGER: Primary | ICD-10-CM

## 2024-02-29 RX ORDER — FLUTICASONE PROPIONATE 50 MCG
2 SPRAY, SUSPENSION (ML) NASAL DAILY
Qty: 9.9 ML | Refills: 0 | Status: SHIPPED | OUTPATIENT
Start: 2024-02-29

## 2024-05-01 ENCOUNTER — TELEPHONE (OUTPATIENT)
Dept: FAMILY MEDICINE | Facility: CLINIC | Age: 44
End: 2024-05-01
Payer: COMMERCIAL

## 2024-05-01 NOTE — LETTER
May 1, 2024      Javy [address]  [facility] [address]    Your team at [facility] cares about your health. We have reviewed your chart and based on our findings; we are making the following recommendations to better manage your health.     You are in particular need of attention regarding the following:     Schedule a DIABETIC FOLLOW UP appointment for Office Visit. Patients with diabetes should see their provider regularly.  Schedule a DIABETIC EYE EXAM.  This exam is done with an optometrist, annually. You can schedule this appointment with your eye doctor.  If you need a referral, please let us know.  Call or MyChart message your clinic to schedule a colonoscopy, schedule/ a FIT Test, or order a Cologuard test. If you are unsure what type of test you need, please call your clinic and speak to clinic staff.   Colon cancer is now the second leading cause of cancer-related deaths in the [place] for both men and women and there are over 130,000 new cases and 50,000 deaths per year from colon cancer. Colonoscopies can prevent 90-95% of these deaths. Problem lesions can be removed before they ever become cancer. This test is not only looking for cancer, but also getting rid of precancerous lesions.   If you are under/uninsured, we recommend you contact the [employer] Program.[employer] is a free colorectal cancer screening program that provides colonoscopies for eligible under/uninsured [place] men and women. If you are interested in receiving a free colonoscopy, please call [employer] at t [phone] (mention code [employer]) to see if you're eligible. Please have them send us the results.   Please schedule a Nurse Only Appointment with your primary care clinic to update your immunizations that are due.    If you have already completed these items, please contact the clinic via phone or   [employer] so your care team can review and update your records. Thank you for   choosing [facility] for your healthcare needs. For any questions,   concerns, or to schedule an appointment please contact our clinic.    Healthy Regards,      Your [facility] Care Team

## 2024-05-01 NOTE — TELEPHONE ENCOUNTER
Patient Quality Outreach    Patient is due for the following:   Diabetes -  A1C, LDL (Fasting), Eye Exam, and Microalbumin BMP  Colon Cancer Screening      Topic Date Due    Pneumococcal Vaccine (1 of 2 - PCV) Never done    Hepatitis B Vaccine (2 of 3 - 19+ 3-dose series) 08/18/2023    COVID-19 Vaccine (4 - 2023-24 season) 09/01/2023       Next Steps:   Schedule a office visit for Diabetes    Type of outreach:    Sent M[employer] message.      Questions for provider review:    None           Meche Arana MA  Chart routed to Care Team.

## 2024-05-08 ENCOUNTER — OFFICE VISIT (OUTPATIENT)
Dept: FAMILY MEDICINE | Facility: CLINIC | Age: 44
End: 2024-05-08
Payer: COMMERCIAL

## 2024-05-08 VITALS
DIASTOLIC BLOOD PRESSURE: 77 MMHG | TEMPERATURE: 98.1 F | SYSTOLIC BLOOD PRESSURE: 138 MMHG | RESPIRATION RATE: 18 BRPM | HEART RATE: 84 BPM | BODY MASS INDEX: 35.74 KG/M2 | HEIGHT: 62 IN | WEIGHT: 194.2 LBS | OXYGEN SATURATION: 97 %

## 2024-05-08 DIAGNOSIS — Z79.4 TYPE 2 DIABETES MELLITUS WITH DIABETIC MONONEUROPATHY, WITH LONG-TERM CURRENT USE OF INSULIN (H): ICD-10-CM

## 2024-05-08 DIAGNOSIS — Z01.818 PREOPERATIVE EXAMINATION: Primary | ICD-10-CM

## 2024-05-08 DIAGNOSIS — E11.41 TYPE 2 DIABETES MELLITUS WITH DIABETIC MONONEUROPATHY, WITH LONG-TERM CURRENT USE OF INSULIN (H): ICD-10-CM

## 2024-05-08 DIAGNOSIS — I10 ESSENTIAL HYPERTENSION: ICD-10-CM

## 2024-05-08 LAB — HBA1C MFR BLD: 6.6 % (ref 0–5.6)

## 2024-05-08 PROCEDURE — 82570 ASSAY OF URINE CREATININE: CPT | Performed by: NURSE PRACTITIONER

## 2024-05-08 PROCEDURE — 82043 UR ALBUMIN QUANTITATIVE: CPT | Performed by: NURSE PRACTITIONER

## 2024-05-08 PROCEDURE — 99214 OFFICE O/P EST MOD 30 MIN: CPT | Performed by: NURSE PRACTITIONER

## 2024-05-08 PROCEDURE — 80048 BASIC METABOLIC PNL TOTAL CA: CPT | Performed by: NURSE PRACTITIONER

## 2024-05-08 PROCEDURE — 83036 HEMOGLOBIN GLYCOSYLATED A1C: CPT | Performed by: NURSE PRACTITIONER

## 2024-05-08 PROCEDURE — 36415 COLL VENOUS BLD VENIPUNCTURE: CPT | Performed by: NURSE PRACTITIONER

## 2024-05-08 ASSESSMENT — PAIN SCALES - GENERAL: PAINLEVEL: NO PAIN (0)

## 2024-05-08 NOTE — LETTER
May 14, 2024    Javy Kiran  [address]  [facility] [place] [address]          Dear ,    We are writing to inform you of your test results.  Your A1c is a test is on the goal.  Continue the current plan   Please contact the clinic if you have additional questions.  Thank you.       Resulted Orders   Albumin Random Urine Quantitative with Creat Ratio   Result Value Ref Range    Creatinine Urine mg/dL 100.0 mg/dL      Comment:      The reference ranges have not been established in urine creatinine. The results should be integrated into the clinical context for interpretation.    Albumin Urine mg/L <12.0 mg/L      Comment:      The reference ranges have not been established in urine albumin. The results should be integrated into the clinical context for interpretation.    Albumin Urine mg/g Cr        Comment:      Unable to calculate, urine albumin and/or urine creatinine is outside detectable limits.  Microalbuminuria is defined as an albumin:creatinine ratio of 17 to 299 for males and 25 to 299 for females. A ratio of albumin:creatinine of 300 or higher is indicative of overt proteinuria.  Due to biologic variability, positive results should be confirmed by a second, first-morning random or 24-hour timed urine specimen. If there is discrepancy, a third specimen is recommended. When 2 out of 3 results are in the microalbuminuria range, this is evidence for incipient nephropathy and warrants increased efforts at glucose control, blood pressure control, and institution of therapy with an angiotensin-converting-enzyme (ACE) inhibitor (if the patient can tolerate it).     HEMOGLOBIN A1C   Result Value Ref Range    Hemoglobin A1C 6.6 (H) 0.0 - 5.6 %      Comment:      Normal <5.7%   Prediabetes 5.7-6.4%    Diabetes 6.5% or higher     Note: Adopted from ADA consensus guidelines.   Basic metabolic panel  (Ca, Cl, CO2, Creat, Gluc, K, Na, BUN)   Result Value Ref Range    Sodium 138 135 - 145 mmol/L      Comment:      Reference  intervals for this test were updated on 09/26/2023 to more accurately reflect our healthy population. There may be differences in the flagging of prior results with similar values performed with this method. Interpretation of those prior results can be made in the context of the updated reference intervals.     Potassium 4.0 3.4 - 5.3 mmol/L    Chloride 104 98 - 107 mmol/L    Carbon Dioxide (CO2) 24 22 - 29 mmol/L    Anion Gap 10 7 - 15 mmol/L    Urea Nitrogen 15.6 6.0 - 20.0 mg/dL    Creatinine 0.56 0.51 - 0.95 mg/dL    GFR Estimate >90 >60 mL/min/1.73m2    Calcium 9.1 8.6 - 10.0 mg/dL    Glucose 93 70 - 99 mg/dL       If you have any questions or concerns, please call the clinic at the number listed above.       Sincerely,      LANNY Renee CNP

## 2024-05-09 LAB
ANION GAP SERPL CALCULATED.3IONS-SCNC: 10 MMOL/L (ref 7–15)
BUN SERPL-MCNC: 15.6 MG/DL (ref 6–20)
CALCIUM SERPL-MCNC: 9.1 MG/DL (ref 8.6–10)
CHLORIDE SERPL-SCNC: 104 MMOL/L (ref 98–107)
CREAT SERPL-MCNC: 0.56 MG/DL (ref 0.51–0.95)
CREAT UR-MCNC: 100 MG/DL
DEPRECATED HCO3 PLAS-SCNC: 24 MMOL/L (ref 22–29)
EGFRCR SERPLBLD CKD-EPI 2021: >90 ML/MIN/1.73M2
GLUCOSE SERPL-MCNC: 93 MG/DL (ref 70–99)
MICROALBUMIN UR-MCNC: <12 MG/L
MICROALBUMIN/CREAT UR: NORMAL MG/G{CREAT}
POTASSIUM SERPL-SCNC: 4 MMOL/L (ref 3.4–5.3)
SODIUM SERPL-SCNC: 138 MMOL/L (ref 135–145)

## 2024-07-12 DIAGNOSIS — I10 ESSENTIAL HYPERTENSION: ICD-10-CM

## 2024-07-12 RX ORDER — METOPROLOL TARTRATE 25 MG/1
25 TABLET, FILM COATED ORAL DAILY
Qty: 30 TABLET | Refills: 3 | Status: SHIPPED | OUTPATIENT
Start: 2024-07-12

## 2024-07-22 ENCOUNTER — VIRTUAL VISIT (OUTPATIENT)
Dept: FAMILY MEDICINE | Facility: CLINIC | Age: 44
End: 2024-07-22
Payer: COMMERCIAL

## 2024-07-22 DIAGNOSIS — E66.812 CLASS 2 SEVERE OBESITY DUE TO EXCESS CALORIES WITH SERIOUS COMORBIDITY AND BODY MASS INDEX (BMI) OF 35.0 TO 35.9 IN ADULT (H): ICD-10-CM

## 2024-07-22 DIAGNOSIS — M54.50 ACUTE RIGHT-SIDED LOW BACK PAIN WITHOUT SCIATICA: Primary | ICD-10-CM

## 2024-07-22 DIAGNOSIS — E66.01 CLASS 2 SEVERE OBESITY DUE TO EXCESS CALORIES WITH SERIOUS COMORBIDITY AND BODY MASS INDEX (BMI) OF 35.0 TO 35.9 IN ADULT (H): ICD-10-CM

## 2024-07-22 DIAGNOSIS — Z11.1 TUBERCULOSIS SCREENING: ICD-10-CM

## 2024-07-22 DIAGNOSIS — Z12.11 SCREEN FOR COLON CANCER: ICD-10-CM

## 2024-07-22 PROCEDURE — 99214 OFFICE O/P EST MOD 30 MIN: CPT | Mod: 95 | Performed by: NURSE PRACTITIONER

## 2024-07-22 RX ORDER — CYCLOBENZAPRINE HCL 5 MG
5 TABLET ORAL 3 TIMES DAILY PRN
COMMUNITY

## 2024-07-22 NOTE — PROGRESS NOTES
"Javy is a 44 year old who is being evaluated via a billable video visit.    How would you like to obtain your AVS? Patient does not want to receive an AVS  If the video visit is dropped, the invitation should be resent by: Text to cell phone: [phone]  Will anyone else be joining your video visit? No      Assessment & Plan     (M54.50) Acute right-sided low back pain without sciatica  (primary encounter diagnosis)  Comment: The patient also  has a history of Degenerative disc disease (DDD) with multilevel spondylotic changes and mild spinal canal stenosis.  Plan: Chiropractic Referral as requested by the patient.  - Continue current medications: Baclofen and Ibuprofen.  - Monitor for any new or worsening symptoms, particularly radicular pain or neurologic deficits.  -Educated the patient on red flag symptoms that would necessitate immediate medical attention (e.g., bowel/bladder incontinence, severe weakness, saddle anesthesia).    (E66.01,  Z68.35) Class 2 severe obesity due to excess calories with serious comorbidity and body mass index (BMI) of 35.0 to 35.9 in adult (H)  Plan: Adult Comprehensive Weight Management         Referral    (Z11.1) Tuberculosis screening  Plan: Quantiferon TB Gold Plus    (Z12.11) Screen for colon cancer  Plan: Fecal colorectal cancer screen (FIT)             BMI  Estimated body mass index is 35.81 kg/m  as calculated from the following:    Height as of 5/8/24: 1.568 m (5' 1.75\").    Weight as of 5/8/24: 88.1 kg (194 lb 3.2 oz).   Weight management plan: Discussed healthy diet and exercise guidelines          Subjective   Javy is a 44 year old, presenting for the following health issues:  Recheck Medication (Patient would like tb testing completed for school.)        7/22/2024     3:58 PM   Additional Questions   Roomed by Meche Arana MA   Accompanied by Self     Video Start Time: 4:30    HPI     Javy Kiran is a  44-year-old female presents with acute right-sided low " back pain without sciatica. The patient has a history of degenerative disc disease (DDD) that is worsening. She has previously tried physical therapy with some relief and is now inquiring about chiropractic care.  The patient denies any recent injury or trauma and reports no associated radicular symptoms. She has had a previous MRI which showed multilevel spondylotic changes superimposed on congenital narrowing of the spinal canal, contributing to mild spinal canal stenosis at L3-L5 and mild bilateral foraminal narrowing at L5-S1.  The patient reports that her symptoms are not exacerbated by bending. She denies fever, bowel or bladder incontinence, neurologic deficits, or saddle anesthesia. Additionally, she denies any history of steroid use, malignancy, or osteomyelitis.  The patient is currently taking Baclofen and Ibuprofen for pain management.    Pt is also asking TB test for school and referral for weight management. Pt is also due for colon cancer screening.          Review of Systems  Constitutional, HEENT, cardiovascular, pulmonary, GI, , musculoskeletal, neuro, skin, endocrine and psych systems are negative, except as otherwise noted.      Objective           Vitals:  No vitals were obtained today due to virtual visit.    Physical Exam   GENERAL: alert and no distress  EYES: Eyes grossly normal to inspection.  No discharge or erythema, or obvious scleral/conjunctival abnormalities.  RESP: No audible wheeze, cough, or visible cyanosis.    SKIN: Visible skin clear. No significant rash, abnormal pigmentation or lesions.  NEURO: Cranial nerves grossly intact.  Mentation and speech appropriate for age.  PSYCH: Appropriate affect, tone, and pace of words        Video-Visit Details    Type of service:  Video Visit   Video End Time:5:00 PM  Originating Location (pt. Location): Home    Distant Location (provider location):  On-site  Platform used for Video Visit: Cara  Signed Electronically by: Jersey Joel  APRN CNP

## 2024-07-23 ENCOUNTER — LAB (OUTPATIENT)
Dept: LAB | Facility: CLINIC | Age: 44
End: 2024-07-23
Payer: COMMERCIAL

## 2024-07-23 DIAGNOSIS — Z11.59 NEED FOR HEPATITIS C SCREENING TEST: ICD-10-CM

## 2024-07-23 DIAGNOSIS — Z79.4 TYPE 2 DIABETES MELLITUS WITH DIABETIC MONONEUROPATHY, WITH LONG-TERM CURRENT USE OF INSULIN (H): ICD-10-CM

## 2024-07-23 DIAGNOSIS — Z11.1 TUBERCULOSIS SCREENING: ICD-10-CM

## 2024-07-23 DIAGNOSIS — E11.41 TYPE 2 DIABETES MELLITUS WITH DIABETIC MONONEUROPATHY, WITH LONG-TERM CURRENT USE OF INSULIN (H): ICD-10-CM

## 2024-07-23 DIAGNOSIS — Z11.4 SCREENING FOR HIV (HUMAN IMMUNODEFICIENCY VIRUS): Primary | ICD-10-CM

## 2024-07-23 DIAGNOSIS — Z12.11 SCREEN FOR COLON CANCER: ICD-10-CM

## 2024-07-23 PROCEDURE — 36415 COLL VENOUS BLD VENIPUNCTURE: CPT

## 2024-07-23 PROCEDURE — 86481 TB AG RESPONSE T-CELL SUSP: CPT

## 2024-07-23 PROCEDURE — 80061 LIPID PANEL: CPT

## 2024-07-23 PROCEDURE — 87389 HIV-1 AG W/HIV-1&-2 AB AG IA: CPT

## 2024-07-23 PROCEDURE — 86803 HEPATITIS C AB TEST: CPT

## 2024-07-23 NOTE — LETTER
July 25, 2024      Javy Kiran  [address]  [facility] [address]        Dear ,    We are writing to inform you of your test results.  All of your labs were normal for you.     Please contact the clinic if you have additional questions.  Thank you.   Resulted Orders   Lipid panel reflex to direct LDL Non-fasting   Result Value Ref Range    Cholesterol 166 <200 mg/dL    Triglycerides 94 <150 mg/dL    Direct Measure HDL 52 >=50 mg/dL    LDL Cholesterol Calculated 95 <=100 mg/dL    Non HDL Cholesterol 114 <130 mg/dL    Patient Fasting > 8hrs? No     Narrative    Cholesterol  Desirable:  <200 mg/dL    Triglycerides  Normal:  Less than 150 mg/dL  Borderline High:  150-199 mg/dL  High:  200-499 mg/dL  Very High:  Greater than or equal to 500 mg/dL    Direct Measure HDL  Female:  Greater than or equal to 50 mg/dL   Male:  Greater than or equal to 40 mg/dL    LDL Cholesterol  Desirable:  <100mg/dL  Above Desirable:  100-129 mg/dL   Borderline High:  130-159 mg/dL   High:  160-189 mg/dL   Very High:  >= 190 mg/dL    Non HDL Cholesterol  Desirable:  130 mg/dL  Above Desirable:  130-159 mg/dL  Borderline High:  160-189 mg/dL  High:  190-219 mg/dL  Very High:  Greater than or equal to 220 mg/dL   HIV Antigen Antibody Combo   Result Value Ref Range    HIV Antigen Antibody Combo Nonreactive Nonreactive      Comment:      Negative HIV-1 p24 antigen and HIV-1/2 antibody screening test results usually indicate the absence of HIV-1 and HIV-2 infection. However, such negative results do not rule-out acute HIV infection.  If acute HIV-1 or HIV-2 infection is suspected, detection of HIV-1 or HIV-2 RNA  is recommended.    Hepatitis C Screen Reflex to HCV RNA Quant and Genotype   Result Value Ref Range    Hepatitis C Antibody Nonreactive Nonreactive      Comment:      A nonreactive screening test result does not exclude the possibility of exposure to or infection with HCV. Nonreactive screening test results in individuals with prior  exposure to HCV may be due to antibody levels below the limit of detection of this assay or lack of reactivity to the HCV antigens used in this assay. Patients with recent HCV infections (<3 months from time of exposure) may have false-negative HCV antibody results due to the time needed for seroconversion (average of 8 to 9 weeks).   Quantiferon TB Gold Plus Grey Tube   Result Value Ref Range    Quantiferon Nil Tube 0.00 IU/mL   Quantiferon TB Gold Plus Green Tube   Result Value Ref Range    Quantiferon TB1 Tube 0.07 IU/mL   Quantiferon TB Gold Plus Yellow Tube   Result Value Ref Range    Quantiferon TB2 Tube 0.00    Quantiferon TB Gold Plus Purple Tube   Result Value Ref Range    Quantiferon Mitogen 10.00 IU/mL   Quantiferon TB Gold Plus   Result Value Ref Range    Quantiferon-TB Gold Plus Negative Negative      Comment:      No interferon gamma response to M.tuberculosis antigens was detected. Infection with M.tuberculosis is unlikely, however a single negative result does not exclude infection. In patients at high risk for infection, a second test should be considered in accordance with the 2017 ATS/IDSA/CDC Clinical Pract  ice Guidelines for Diagnosis of Tuberculosis in Adults and Children     TB1 Ag minus Nil Value 0.07 IU/mL    TB2 Ag minus Nil Value 0.00 IU/mL    Mitogen minus Nil Result 10.00 IU/mL    Nil Result 0.00 IU/mL   If you have any questions or concerns, please call the clinic at the number listed above.       Sincerely,      LANNY Renee CNP

## 2024-07-24 LAB
CHOLEST SERPL-MCNC: 166 MG/DL
FASTING STATUS PATIENT QL REPORTED: NO
HCV AB SERPL QL IA: NONREACTIVE
HDLC SERPL-MCNC: 52 MG/DL
HIV 1+2 AB+HIV1 P24 AG SERPL QL IA: NONREACTIVE
LDLC SERPL CALC-MCNC: 95 MG/DL
NONHDLC SERPL-MCNC: 114 MG/DL
TRIGL SERPL-MCNC: 94 MG/DL

## 2024-07-25 LAB
GAMMA INTERFERON BACKGROUND BLD IA-ACNC: 0 IU/ML
M TB IFN-G BLD-IMP: NEGATIVE
M TB IFN-G CD4+ BCKGRND COR BLD-ACNC: 10 IU/ML
MITOGEN IGNF BCKGRD COR BLD-ACNC: 0 IU/ML
MITOGEN IGNF BCKGRD COR BLD-ACNC: 0.07 IU/ML
QUANTIFERON MITOGEN: 10 IU/ML
QUANTIFERON NIL TUBE: 0 IU/ML
QUANTIFERON TB1 TUBE: 0.07 IU/ML
QUANTIFERON TB2 TUBE: 0

## 2024-08-12 ENCOUNTER — TRANSFERRED RECORDS (OUTPATIENT)
Dept: MULTI SPECIALTY CLINIC | Facility: CLINIC | Age: 44
End: 2024-08-12

## 2024-08-12 LAB — RETINOPATHY: NORMAL

## 2024-10-05 ENCOUNTER — HEALTH MAINTENANCE LETTER (OUTPATIENT)
Age: 44
End: 2024-10-05

## 2024-10-14 ENCOUNTER — OFFICE VISIT (OUTPATIENT)
Dept: FAMILY MEDICINE | Facility: CLINIC | Age: 44
End: 2024-10-14
Payer: COMMERCIAL

## 2024-10-14 VITALS
RESPIRATION RATE: 18 BRPM | BODY MASS INDEX: 35.04 KG/M2 | HEIGHT: 62 IN | OXYGEN SATURATION: 100 % | HEART RATE: 71 BPM | WEIGHT: 190.4 LBS | TEMPERATURE: 97.1 F

## 2024-10-14 DIAGNOSIS — E11.41 TYPE 2 DIABETES MELLITUS WITH DIABETIC MONONEUROPATHY, WITH LONG-TERM CURRENT USE OF INSULIN (H): ICD-10-CM

## 2024-10-14 DIAGNOSIS — G89.29 CHRONIC BILATERAL LOW BACK PAIN, UNSPECIFIED WHETHER SCIATICA PRESENT: Primary | ICD-10-CM

## 2024-10-14 DIAGNOSIS — Z79.4 TYPE 2 DIABETES MELLITUS WITH DIABETIC MONONEUROPATHY, WITH LONG-TERM CURRENT USE OF INSULIN (H): ICD-10-CM

## 2024-10-14 DIAGNOSIS — M54.50 CHRONIC BILATERAL LOW BACK PAIN, UNSPECIFIED WHETHER SCIATICA PRESENT: Primary | ICD-10-CM

## 2024-10-14 PROBLEM — M54.42 CHRONIC BILATERAL LOW BACK PAIN WITH BILATERAL SCIATICA: Status: ACTIVE | Noted: 2024-10-14

## 2024-10-14 PROBLEM — M54.41 CHRONIC BILATERAL LOW BACK PAIN WITH BILATERAL SCIATICA: Status: ACTIVE | Noted: 2024-10-14

## 2024-10-14 LAB
EST. AVERAGE GLUCOSE BLD GHB EST-MCNC: 140 MG/DL
HBA1C MFR BLD: 6.5 % (ref 0–5.6)

## 2024-10-14 PROCEDURE — 83036 HEMOGLOBIN GLYCOSYLATED A1C: CPT | Performed by: NURSE PRACTITIONER

## 2024-10-14 PROCEDURE — 99214 OFFICE O/P EST MOD 30 MIN: CPT | Performed by: NURSE PRACTITIONER

## 2024-10-14 PROCEDURE — 36415 COLL VENOUS BLD VENIPUNCTURE: CPT | Performed by: NURSE PRACTITIONER

## 2024-10-14 RX ORDER — CELECOXIB 100 MG/1
100 CAPSULE ORAL 2 TIMES DAILY
Qty: 20 CAPSULE | Refills: 0 | Status: SHIPPED | OUTPATIENT
Start: 2024-10-14

## 2024-10-14 ASSESSMENT — ENCOUNTER SYMPTOMS: BACK PAIN: 1

## 2024-10-14 ASSESSMENT — PAIN SCALES - GENERAL: PAINLEVEL: NO PAIN (0)

## 2024-10-14 NOTE — Clinical Note
Please abstract the following data from this visit with this patient into the appropriate field in Epic:  Tests that can be patient reported without a hard copy: Reported that she had diabetic eye exam at [facility]. {Quality Abstract List (Optional):632918}  Other Tests found in the patient's chart through Chart Review/Care Everywhere:  {Abstract Quality List (Optional):864978}  Note to Abstraction: If this section is blank, no results were found via Chart Review/Care Everywhere.

## 2024-10-14 NOTE — LETTER
October 15, 2024      Javy Kiran  [address]  [facility] [address]        Dear ,    We are writing to inform you of your test results.    Your A1c was within the goal range for your. The goal for diabetics without other complications is less than 7. You should recheck your A1c in 3 months. In the meantime, a healthy diet high in lean protein, whole grain carbohydrates and healthy fats such as olive oil or canola will help mainain a healthy blood sugar     Please contact the clinic if you have additional questions.  Thank you.     Resulted Orders   Hemoglobin A1c   Result Value Ref Range    Estimated Average Glucose 140 (H) <117 mg/dL    Hemoglobin A1C 6.5 (H) 0.0 - 5.6 %      Comment:      Normal <5.7%   Prediabetes 5.7-6.4%    Diabetes 6.5% or higher     Note: Adopted from ADA consensus guidelines.       If you have any questions or concerns, please call the clinic at the number listed above.       Sincerely,      LANNY Renee CNP

## 2024-10-14 NOTE — PROGRESS NOTES
Assessment & Plan     (M54.42,  M54.41,  G89.29) Chronic bilateral low back pain with bilateral sciatica  (primary encounter diagnosis)  Comment:The patient is experiencing chronic low back pain likely related to the multilevel spondylotic changes and mild spinal canal stenosis identified on MRI.  Chronic low back pain, likely secondary to multilevel spondylotic changes and spinal canal stenosis. No acute neurological deficits or red flag symptoms present.  Plan: celecoxib (CELEBREX) 100 MG capsule, Orthopedic         Referral  for evaluation.  -Patient had physical therapy with no success.  Currently, she declined physical therapy.    -If symptoms worsen or new neurological deficits develop, consider further imaging or referral to a specialist for evaluation of potential surgical options.  -Educated the patient on lifestyle modifications, including weight management and ergonomic adjustments to reduce strain on the back    (E11.41,  Z79.4) Type 2 diabetes mellitus with diabetic mononeuropathy, with long-term current use of insulin (H)  Comment: Her diabetes is well-controlled also her A1c is 6.5.  Currently, she is not taking her diabetic medication.  Plan: Hemoglobin A1c    -Patient stated she had a diabetic eye exam this year at [facility].  -Well controlled without medications        Please abstract the following data from this visit with this patient into the appropriate field in Epic:    Tests that can be patient reported without a hard copy: Reported that she had diabetic eye exam at [facility].      Other Tests found in the patient's chart through Chart Review/Care Everywhere:        Note to Abstraction: If this section is blank, no results were found via Chart Review/Care Everywhere.             Davian Palafox is a 44 year old, presenting for the following health issues:  Back Pain      10/14/2024     1:07 PM   Additional Questions   Roomed by Meche  "Sumit ORTIZ   Accompanied by Self     Via the Health Maintenance questionnaire, the patient has reported the following services have been completed -Eye Exam: [facility] 2024-08-12, this information has been sent to the abstraction team.     Javy Kiran is a 44-year-old female presents with chronic low back pain that has persisted for the past two years. She underwent back surgery in 2019, but her symptoms have continued. The patient reports that the pain radiates to her bilateral lower extremities. Notably, the pain is not exacerbated by bending. She denies experiencing any fever, bowel or bladder incontinence, neurologic deficits, or saddle anesthesia. The patient has no history of steroid use, malignancy, or osteomyelitis. An MRI performed last year revealed multilevel spondylotic changes superimposed on congenital narrowing of the spinal canal, which is contributing to mild spinal canal stenosis at the L3-L5 levels, as well as mild bilateral foraminal narrowing at L5-S1.      Diabetes Follow-up    How often are you checking your blood sugar? Not at all  What concerns do you have today about your diabetes? None   Do you have any of these symptoms? (Select all that apply)  No numbness or tingling in feet.  No redness, sores or blisters on feet.  No complaints of excessive thirst.  No reports of blurry vision.  No significant changes to weight.      BP Readings from Last 2 Encounters:   05/08/24 138/77   01/17/24 126/83     Hemoglobin A1C (%)   Date Value   05/08/2024 6.6 (H)   01/17/2024 6.0 (H)     LDL Cholesterol Calculated (mg/dL)   Date Value   07/23/2024 95                   Review of Systems  Constitutional, HEENT, cardiovascular, pulmonary, GI, , musculoskeletal, neuro, skin, endocrine and psych systems are negative, except as otherwise noted.      Objective    Pulse 71   Temp 97.1  F (36.2  C) (Temporal)   Resp 18   Ht 1.562 m (5' 1.5\")   Wt 86.4 kg (190 lb 6.4 oz)   LMP 10/13/2024 " (Approximate)   SpO2 100%   BMI 35.39 kg/m    Body mass index is 35.39 kg/m .  Physical Exam  Constitutional:       Appearance: She is obese.   HENT:      Head: Normocephalic and atraumatic.      Nose: Nose normal.   Cardiovascular:      Rate and Rhythm: Normal rate and regular rhythm.      Pulses: Normal pulses.      Heart sounds: Normal heart sounds.   Pulmonary:      Effort: Pulmonary effort is normal.      Breath sounds: Normal breath sounds.   Abdominal:      General: Bowel sounds are normal.      Palpations: Abdomen is soft.   Musculoskeletal:      Cervical back: Normal range of motion and neck supple.      Lumbar back: Negative right straight leg raise test and negative left straight leg raise test.      Comments: On physical examination, there is tenderness noted in the lumbar region. Range of motion in the lumbar spine is limited due to pain, but the patient is able to flex and extend without significant exacerbation of symptoms.   Skin:     Capillary Refill: Capillary refill takes less than 2 seconds.      Findings: No lesion or rash.   Neurological:      Mental Status: She is alert and oriented to person, place, and time.   Psychiatric:         Mood and Affect: Mood normal.         Behavior: Behavior normal.         Thought Content: Thought content normal.         Judgment: Judgment normal.                    Signed Electronically by: LANNY Renee CNP    Answers submitted by the patient for this visit:  Back Pain Visit Questionnaire (Submitted on 10/14/2024)  Your back pain is: chronic  Chronic or Recurring Back Pain Visit Questionnaire (Submitted on 10/14/2024)  Where is your back pain located? : right lower back, left lower back  How would you describe your back pain? : dull ache, shooting  Where does your back pain spread? : right foot, left foot  Since you noticed your back pain, how has it changed? : gradually worsening  Does your back pain interfere with your job?: Yes  General Questionnaire  (Submitted on 10/14/2024)  Chief Complaint: Chronic problems general questions HPI Form  How many servings of fruits and vegetables do you eat daily?: 0-1  On average, how many sweetened beverages do you drink each day (Examples: soda, juice, sweet tea, etc.  Do NOT count diet or artificially sweetened beverages)?: 0  How many minutes a day do you exercise enough to make your heart beat faster?: 9 or less  How many days a week do you exercise enough to make your heart beat faster?: 3 or less  How many days per week do you miss taking your medication?: 1  What makes it hard for you to take your medication every day?: remembering to take

## 2024-12-02 DIAGNOSIS — R09.81 NASAL CONGESTION: Primary | ICD-10-CM

## 2024-12-02 RX ORDER — FLUTICASONE PROPIONATE 50 MCG
2 SPRAY, SUSPENSION (ML) NASAL 2 TIMES DAILY
Qty: 9.9 ML | Refills: 0 | Status: SHIPPED | OUTPATIENT
Start: 2024-12-02

## 2025-01-04 DIAGNOSIS — I10 ESSENTIAL HYPERTENSION: ICD-10-CM

## 2025-01-04 RX ORDER — METOPROLOL TARTRATE 25 MG/1
25 TABLET, FILM COATED ORAL DAILY
Qty: 90 TABLET | Refills: 0 | Status: SHIPPED | OUTPATIENT
Start: 2025-01-04

## 2025-01-13 DIAGNOSIS — I10 ESSENTIAL HYPERTENSION: ICD-10-CM

## 2025-01-13 RX ORDER — METOPROLOL TARTRATE 25 MG/1
25 TABLET, FILM COATED ORAL DAILY
Qty: 90 TABLET | Refills: 1 | Status: SHIPPED | OUTPATIENT
Start: 2025-01-13

## 2025-01-19 ENCOUNTER — HEALTH MAINTENANCE LETTER (OUTPATIENT)
Age: 45
End: 2025-01-19

## 2025-02-24 ENCOUNTER — OFFICE VISIT (OUTPATIENT)
Dept: FAMILY MEDICINE | Facility: CLINIC | Age: 45
End: 2025-02-24
Payer: COMMERCIAL

## 2025-02-24 VITALS
SYSTOLIC BLOOD PRESSURE: 131 MMHG | WEIGHT: 186.2 LBS | DIASTOLIC BLOOD PRESSURE: 87 MMHG | RESPIRATION RATE: 16 BRPM | HEART RATE: 104 BPM | HEIGHT: 62 IN | OXYGEN SATURATION: 100 % | TEMPERATURE: 98 F | BODY MASS INDEX: 34.27 KG/M2

## 2025-02-24 DIAGNOSIS — E11.41 TYPE 2 DIABETES MELLITUS WITH DIABETIC MONONEUROPATHY, WITH LONG-TERM CURRENT USE OF INSULIN (H): ICD-10-CM

## 2025-02-24 DIAGNOSIS — I10 ESSENTIAL HYPERTENSION: ICD-10-CM

## 2025-02-24 DIAGNOSIS — Z12.11 SCREEN FOR COLON CANCER: ICD-10-CM

## 2025-02-24 DIAGNOSIS — E66.812 CLASS 2 SEVERE OBESITY DUE TO EXCESS CALORIES WITH SERIOUS COMORBIDITY AND BODY MASS INDEX (BMI) OF 35.0 TO 35.9 IN ADULT (H): ICD-10-CM

## 2025-02-24 DIAGNOSIS — E66.01 CLASS 2 SEVERE OBESITY DUE TO EXCESS CALORIES WITH SERIOUS COMORBIDITY AND BODY MASS INDEX (BMI) OF 35.0 TO 35.9 IN ADULT (H): ICD-10-CM

## 2025-02-24 DIAGNOSIS — Z00.00 ROUTINE GENERAL MEDICAL EXAMINATION AT A HEALTH CARE FACILITY: Primary | ICD-10-CM

## 2025-02-24 DIAGNOSIS — Z79.4 TYPE 2 DIABETES MELLITUS WITH DIABETIC MONONEUROPATHY, WITH LONG-TERM CURRENT USE OF INSULIN (H): ICD-10-CM

## 2025-02-24 LAB
BASOPHILS # BLD AUTO: 0.1 10E3/UL (ref 0–0.2)
BASOPHILS NFR BLD AUTO: 1 %
EOSINOPHIL # BLD AUTO: 0.2 10E3/UL (ref 0–0.7)
EOSINOPHIL NFR BLD AUTO: 3 %
ERYTHROCYTE [DISTWIDTH] IN BLOOD BY AUTOMATED COUNT: 13 % (ref 10–15)
EST. AVERAGE GLUCOSE BLD GHB EST-MCNC: 143 MG/DL
HBA1C MFR BLD: 6.6 % (ref 0–5.6)
HCT VFR BLD AUTO: 40 % (ref 35–47)
HGB BLD-MCNC: 12.4 G/DL (ref 11.7–15.7)
IMM GRANULOCYTES # BLD: 0 10E3/UL
IMM GRANULOCYTES NFR BLD: 1 %
LYMPHOCYTES # BLD AUTO: 2.5 10E3/UL (ref 0.8–5.3)
LYMPHOCYTES NFR BLD AUTO: 38 %
MCH RBC QN AUTO: 28.4 PG (ref 26.5–33)
MCHC RBC AUTO-ENTMCNC: 31 G/DL (ref 31.5–36.5)
MCV RBC AUTO: 92 FL (ref 78–100)
MONOCYTES # BLD AUTO: 0.3 10E3/UL (ref 0–1.3)
MONOCYTES NFR BLD AUTO: 5 %
NEUTROPHILS # BLD AUTO: 3.4 10E3/UL (ref 1.6–8.3)
NEUTROPHILS NFR BLD AUTO: 53 %
PLATELET # BLD AUTO: 281 10E3/UL (ref 150–450)
RBC # BLD AUTO: 4.37 10E6/UL (ref 3.8–5.2)
WBC # BLD AUTO: 6.5 10E3/UL (ref 4–11)

## 2025-02-24 PROCEDURE — 83036 HEMOGLOBIN GLYCOSYLATED A1C: CPT | Performed by: NURSE PRACTITIONER

## 2025-02-24 PROCEDURE — 99214 OFFICE O/P EST MOD 30 MIN: CPT | Mod: 25 | Performed by: NURSE PRACTITIONER

## 2025-02-24 PROCEDURE — 99396 PREV VISIT EST AGE 40-64: CPT | Performed by: NURSE PRACTITIONER

## 2025-02-24 PROCEDURE — 85025 COMPLETE CBC W/AUTO DIFF WBC: CPT | Performed by: NURSE PRACTITIONER

## 2025-02-24 PROCEDURE — G2211 COMPLEX E/M VISIT ADD ON: HCPCS | Performed by: NURSE PRACTITIONER

## 2025-02-24 PROCEDURE — 80053 COMPREHEN METABOLIC PANEL: CPT | Performed by: NURSE PRACTITIONER

## 2025-02-24 PROCEDURE — 36415 COLL VENOUS BLD VENIPUNCTURE: CPT | Performed by: NURSE PRACTITIONER

## 2025-02-24 RX ORDER — LANCETS
EACH MISCELLANEOUS
Qty: 100 EACH | Refills: 11 | Status: SHIPPED | OUTPATIENT
Start: 2025-02-24

## 2025-02-24 SDOH — HEALTH STABILITY: PHYSICAL HEALTH: ON AVERAGE, HOW MANY MINUTES DO YOU ENGAGE IN EXERCISE AT THIS LEVEL?: 0 MIN

## 2025-02-24 SDOH — HEALTH STABILITY: PHYSICAL HEALTH: ON AVERAGE, HOW MANY DAYS PER WEEK DO YOU ENGAGE IN MODERATE TO STRENUOUS EXERCISE (LIKE A BRISK WALK)?: 0 DAYS

## 2025-02-24 ASSESSMENT — SOCIAL DETERMINANTS OF HEALTH (SDOH): HOW OFTEN DO YOU GET TOGETHER WITH FRIENDS OR RELATIVES?: ONCE A WEEK

## 2025-02-24 NOTE — LETTER
[facility]  [address]  [place] [address]  [phone]    2025      Name: Javy Kiran  : 1980  [address]  [place] [address]  [phone] (home)     Parent/Guardian: Clary Kiran and       Date of last physical exam: 25   Are immunizations up to date? Yes  Immunization History   Administered Date(s) Administered    COVID-19 Monovalent 18+ (Moderna) 2021, 04/10/2021, 2022    Hepatitis B, Adult 2023    INFLUENZA,TRIVALENT (FLUCELVAX) 2024    Influenza Vaccine >6 months,quad, PF 2020, 2021, 10/12/2022    Influenza,INJ,MDCK,PF,Quad >6mo(Flucelvax) 10/15/2023    MMR 2007, 2008    TDAP (Adacel,Boostrix) 10/18/2021    Varicella 2008, 10/01/2008       How long have you been seeing this patient ? Since   How frequently do you see this patient when she is not ill? Every 6 months  Does this child have any allergies (including allergies to medication)? Patient has no known allergies.  Is a modified diet necessary? Yes: low fat, sugar, and salt diet  Is any condition present that might result in an emergency? No         ____________________________________________  LANNY Renee CNP

## 2025-02-24 NOTE — PROGRESS NOTES
"Preventive Care Visit  [facility] LANNY Quiroz CNP, Family Medicine  Feb 24, 2025      Assessment & Plan     (Z00.00) Routine general medical examination at a health care facility  (primary encounter diagnosis)  Plan: CBC with platelets and differential,         Comprehensive metabolic panel (BMP + Alb, Alk         Phos, ALT, AST, Total. Bili, TP)  -Reviewed recommendations for screening, immunizations, and discussed healthy lifestyle choices.  -Counseled patient on medication adherence   -F/u 1 year for next annual physical  - RTO sooner prn     (E11.41,  Z79.4) Type 2 diabetes mellitus with diabetic mononeuropathy, with long-term current use of insulin (H)  Comment: Well controlled with diet. No symptoms of hyperglycemia or peripheral neuropathy. A1c is 6.6.  Plan: HEMOGLOBIN A1C, blood glucose (NO BRAND         SPECIFIED) test strip, thin (NO BRAND         SPECIFIED) lancets, FOOT EXAM  -Continue diet control.  -Encouraged avoidance of high sugar and high carb diet.  -Schedule follow-up in 3 months.    (I10) Essential hypertension  Comment: Well controlled with Metoprolol 25mg daily. No symptoms of cardiovascular disease.  PLAN:   -Continue Metoprolol 25mg daily.  -Encouraged low salt, low fat diet, and regular exercise.      (E66.812,  E66.01,  Z68.35) Class 2 severe obesity due to excess calories with serious comorbidity and body mass index (BMI) of 35.0 to 35.9 in adult (H)  PLAN:   - BMI remains elevated  -Reinforced need for reduced calorie, low fat diet and increased physical   activity.     (Z12.11) Screen for colon cancer  Plan: Colonoscopy Screening  Referral                      BMI  Estimated body mass index is 34.06 kg/m  as calculated from the following:    Height as of this encounter: 1.575 m (5' 2\").    Weight as of this encounter: 84.5 kg (186 lb 3.2 oz).   Weight management plan: Discussed healthy diet and exercise guidelines    Counseling  Appropriate " preventive services were addressed with this patient via screening, questionnaire, or discussion as appropriate for fall prevention, nutrition, physical activity, Tobacco-use cessation, social engagement, weight loss and cognition.  Checklist reviewing preventive services available has been given to the patient.  Reviewed patient's diet, addressing concerns and/or questions.   The patient was instructed to see the dentist every 6 months.       Work on weight loss  Regular exercise  See Patient Instructions    Davian Palafox is a 44 year old, presenting for the following:  Physical        2/24/2025     1:52 PM   Additional Questions   Roomed by Nydia MILLS CMA   Accompanied by alone         2/24/2025     1:52 PM   Patient Reported Additional Medications   Patient reports taking the following new medications none          HPI    Javy Kiran is a 44 year old female who presents for an annual physical exam.    She is up to date on most immunizations except for the pneumococcal and COVID vaccines, which she declines to take today.    Her type two diabetes is well controlled with diet alone. She is not currently taking any diabetic medications. No symptoms such as polyuria, polyphagia, polydipsia, numbness, tingling, or any issues with her feet. Her most recent A1c is 6.6, and she does not check her blood glucose at home.    Her hypertension is well controlled with metoprolol 25 mg once daily. Her blood pressure is consistently below 140/90. No chest pain, shortness of breath, palpitations, or lower extremity edema. She follows a low-salt diet.           Health Care Directive  Patient does not have a Health Care Directive: Discussed advance care planning with patient; however, patient declined at this time.      2/24/2025   General Health   How would you rate your overall physical health? Excellent   Feel stress (tense, anxious, or unable to sleep) Not at all         2/24/2025   Nutrition   Three or more servings of  calcium each day? Yes   Diet: Diabetic   How many servings of fruit and vegetables per day? (!) 2-3   How many sweetened beverages each day? 0-1         2/24/2025   Exercise   Days per week of moderate/strenous exercise 0 days   Average minutes spent exercising at this level 0 min   (!) EXERCISE CONCERN      2/24/2025   Social Factors   Frequency of gathering with friends or relatives Once a week   Worry food won't last until get money to buy more No   Food not last or not have enough money for food? No   Do you have housing? (Housing is defined as stable permanent housing and does not include staying ouside in a car, in a tent, in an abandoned building, in an overnight shelter, or couch-surfing.) Yes   Are you worried about losing your housing? No   Lack of transportation? No   Unable to get utilities (heat,electricity)? No         2/24/2025   Dental   Dentist two times every year? (!) NO            Today's PHQ-2 Score:       2/24/2025     1:51 PM   PHQ-2 ( 1999 [employer])   Q1: Little interest or pleasure in doing things 0   Q2: Feeling down, depressed or hopeless 0   PHQ-2 Score 0    Q1: Little interest or pleasure in doing things Not at all   Q2: Feeling down, depressed or hopeless Not at all   PHQ-2 Score 0       Patient-reported           2/24/2025   Substance Use   Alcohol more than 3/day or more than 7/wk No   Do you use any other substances recreationally? No     Social History     Tobacco Use    Smoking status: Never     Passive exposure: Never    Smokeless tobacco: Never   Vaping Use    Vaping status: Never Used                  2/24/2025   STI Screening   New sexual partner(s) since last STI/HIV test? No     History of abnormal Pap smear: No        11/11/2020     3:47 PM   PAP / HPV   PAP-ABSTRACT See Scanned Document           This result is from an external source.     ASCVD Risk   The 10-year ASCVD risk score (Sophia EDUARDO, et al., 2019) is: 6.1%    Values used to calculate the score:      Age: 44  years      Sex: Female      Is Non- : Yes      Diabetic: Yes      Tobacco smoker: No      Systolic Blood Pressure: 131 mmHg      Is BP treated: Yes      HDL Cholesterol: 52 mg/dL      Total Cholesterol: 166 mg/dL        2/24/2025   Contraception/Family Planning   Questions about contraception or family planning No        Reviewed and updated as needed this visit by Provider                    No past medical history on file.  No past surgical history on file.  OB History   No obstetric history on file.     Lab work is in process  Labs reviewed in EPIC  BP Readings from Last 3 Encounters:   02/24/25 131/87   05/08/24 138/77   01/17/24 126/83    Wt Readings from Last 3 Encounters:   02/24/25 84.5 kg (186 lb 3.2 oz)   10/14/24 86.4 kg (190 lb 6.4 oz)   05/08/24 88.1 kg (194 lb 3.2 oz)                  Patient Active Problem List   Diagnosis    Type 2 diabetes mellitus with diabetic mononeuropathy, with long-term current use of insulin (H)    Essential hypertension    Class 2 severe obesity due to excess calories with serious comorbidity in adult (H)    Chronic bilateral low back pain with bilateral sciatica     No past surgical history on file.    Social History     Tobacco Use    Smoking status: Never     Passive exposure: Never    Smokeless tobacco: Never   Substance Use Topics    Alcohol use: Not on file     No family history on file.      Current Outpatient Medications   Medication Sig Dispense Refill    blood glucose (NO BRAND SPECIFIED) test strip Use to test blood sugar 3 times daily or as directed. To accompany: Blood Glucose Monitor Brands: per insurance. 100 strip 11    metoprolol tartrate (LOPRESSOR) 25 MG tablet Take 1 tablet (25 mg) by mouth daily. 90 tablet 1    thin (NO BRAND SPECIFIED) lancets Use to test blood sugar 3 times daily or as directed. To accompany: Blood Glucose Monitor Brands: per insurance. 100 each 11    celecoxib (CELEBREX) 100 MG capsule Take 1 capsule (100 mg)  "by mouth 2 times daily. (Patient not taking: Reported on 2/24/2025) 20 capsule 0    fluticasone (FLONASE) 50 MCG/ACT nasal spray Spray 2 sprays into both nostrils 2 times daily. (Patient not taking: Reported on 2/24/2025) 9.9 mL 0     No Known Allergies  Recent Labs   Lab Test 02/24/25  1442 10/14/24  1353 07/23/24  1350 05/08/24  1453 01/17/24  1627 01/22/23  1152 12/22/22 2017 07/03/21  0358   A1C 6.6* 6.5*  --  6.6*   < >  --   --   --    LDL  --   --  95  --   --   --   --   --    HDL  --   --  52  --   --   --   --   --    TRIG  --   --  94  --   --   --   --   --    CR  --   --   --  0.56  --  0.51   < > 0.60   GFRESTIMATED  --   --   --  >90  --  >90   < > >90   GFRESTBLACK  --   --   --   --   --   --   --  >90   POTASSIUM  --   --   --  4.0  --  4.2   < > 3.4    < > = values in this interval not displayed.          Review of Systems  Constitutional, HEENT, cardiovascular, pulmonary, GI, , musculoskeletal, neuro, skin, endocrine and psych systems are negative, except as otherwise noted.     Objective    Exam  /87   Pulse 104   Temp 98  F (36.7  C) (Temporal)   Resp 16   Ht 1.575 m (5' 2\")   Wt 84.5 kg (186 lb 3.2 oz)   LMP 02/24/2025   SpO2 100%   BMI 34.06 kg/m     Estimated body mass index is 34.06 kg/m  as calculated from the following:    Height as of this encounter: 1.575 m (5' 2\").    Weight as of this encounter: 84.5 kg (186 lb 3.2 oz).    Physical Exam  GENERAL: alert and no distress  EYES: Eyes grossly normal to inspection, PERRL and conjunctivae and sclerae normal  HENT: ear canals and TM's normal, nose and mouth without ulcers or lesions  NECK: no adenopathy, no asymmetry, masses, or scars  RESP: lungs clear to auscultation - no rales, rhonchi or wheezes  CV: regular rate and rhythm, normal S1 S2, no S3 or S4, no murmur, click or rub, no peripheral edema  ABDOMEN: soft, nontender, no hepatosplenomegaly, no masses and bowel sounds normal  MS: no gross musculoskeletal defects noted, " no edema  SKIN: no suspicious lesions or rashes  NEURO: Normal strength and tone, mentation intact and speech normal  PSYCH: mentation appears normal, affect normal/bright        Signed Electronically by: LANNY Renee CNP      I am utilizing AI dictation through [employer] to document the patient's history and physical examination. Please note that while the AI is designed to assist in capturing detailed information, there may be errors in the dictation. I will review and edit the content for accuracy before finalizing.

## 2025-02-24 NOTE — PATIENT INSTRUCTIONS
"Patient Education   Eating Healthy Foods: Care Instructions  With every meal, you can make healthy food choices. Try to eat a variety of fruits, vegetables, whole grains, lean proteins, and low-fat dairy products. This can help you get the right balance of nutrients, including vitamins and minerals. Small changes add up over time. You can start by adding one healthy food to your meals each day.    Try to make half your plate fruits and vegetables, one-fourth whole grains, and one-fourth lean proteins. Try including dairy with your meals.   Eat more fruits and vegetables. Try to have them with most meals and snacks.   Foods for healthy eating        Fruits   These can be fresh, frozen, canned, or dried.  Try to choose whole fruit rather than fruit juice.  Eat a variety of colors.        Vegetables   These can be fresh, frozen, canned, or dried.  Beans, peas, and lentils count too.        Whole grains   Choose whole-grain breads, cereals, and noodles.  Try brown rice.        Lean proteins   These can include lean meat, poultry, fish, and eggs.  You can also have tofu, beans, peas, lentils, nuts, and seeds.        Dairy   Try milk, yogurt, and cheese.  Choose low-fat or fat-free when you can.  If you need to, use lactose-free milk or fortified plant-based milk products, such as soy milk.        Water   Drink water when you're thirsty.  Limit sugar-sweetened drinks, including soda, fruit drinks, and sports drinks.  Where can you learn more?  Go to https://www.[employer].net/patiented  Enter T756 in the search box to learn more about \"Eating Healthy Foods: Care Instructions.\"  Current as of: September 20, 2023  Content Version: 14.3    2024 [employer].   Care instructions adapted under license by your healthcare professional. If you have questions about a medical condition or this instruction, always ask your healthcare professional. [employer] disclaims any warranty or liability for your use of " this information.    Preventive Care Advice   This is general advice given by our system to help you stay healthy. However, your care team may have specific advice just for you. Please talk to your care team about your preventive care needs.  Nutrition  Eat 5 or more servings of fruits and vegetables each day.  Try wheat bread, brown rice and whole grain pasta (instead of white bread, rice, and pasta).  Get enough calcium and vitamin D. Check the label on foods and aim for 100% of the RDA (recommended daily allowance).  Lifestyle  Exercise at least 150 minutes each week  (30 minutes a day, 5 days a week).  Do muscle strengthening activities 2 days a week. These help control your weight and prevent disease.  No smoking.  Wear sunscreen to prevent skin cancer.  Have a dental exam and cleaning every 6 months.  Yearly exams  See your health care team every year to talk about:  Any changes in your health.  Any medicines your care team has prescribed.  Preventive care, family planning, and ways to prevent chronic diseases.  Shots (vaccines)   HPV shots (up to age 26), if you've never had them before.  Hepatitis B shots (up to age 59), if you've never had them before.  COVID-19 shot: Get this shot when it's due.  Flu shot: Get a flu shot every year.  Tetanus shot: Get a tetanus shot every 10 years.  Pneumococcal, hepatitis A, and RSV shots: Ask your care team if you need these based on your risk.  Shingles shot (for age 50 and up)  General health tests  Diabetes screening:  Starting at age 35, Get screened for diabetes at least every 3 years.  If you are younger than age 35, ask your care team if you should be screened for diabetes.  Cholesterol test: At age 39, start having a cholesterol test every 5 years, or more often if advised.  Bone density scan (DEXA): At age 50, ask your care team if you should have this scan for osteoporosis (brittle bones).  Hepatitis C: Get tested at least once in your life.  STIs (sexually  transmitted infections)  Before age 24: Ask your care team if you should be screened for STIs.  After age 24: Get screened for STIs if you're at risk. You are at risk for STIs (including HIV) if:  You are sexually active with more than one person.  You don't use condoms every time.  You or a partner was diagnosed with a sexually transmitted infection.  If you are at risk for HIV, ask about PrEP medicine to prevent HIV.  Get tested for HIV at least once in your life, whether you are at risk for HIV or not.  Cancer screening tests  Cervical cancer screening: If you have a cervix, begin getting regular cervical cancer screening tests starting at age 21.  Breast cancer scan (mammogram): If you've ever had breasts, begin having regular mammograms starting at age 40. This is a scan to check for breast cancer.  Colon cancer screening: It is important to start screening for colon cancer at age 45.  Have a colonoscopy test every 10 years (or more often if you're at risk) Or, ask your provider about stool tests like a FIT test every year or Cologuard test every 3 years.  To learn more about your testing options, visit:   .  For help making a decision, visit:   https://bit.ly/vk91644.  Prostate cancer screening test: If you have a prostate, ask your care team if a prostate cancer screening test (PSA) at age 55 is right for you.  Lung cancer screening: If you are a current or former smoker ages 50 to 80, ask your care team if ongoing lung cancer screenings are right for you.  For informational purposes only. Not to replace the advice of your health care provider. Copyright   2023 [employer]. All rights reserved. Clinically reviewed by the [facility] Transitions Program. [employer] [id number] - REV 01/24.

## 2025-02-24 NOTE — LETTER
February 25, 2025      Javy Kiran  [address]  [facility] [place] [address]        Dear ,    We are writing to inform you of your test results.    Your A1c was within the goal range for your. The goal for diabetics without other complications is less than 7. You should recheck your A1c in 3 months. In the meantime, a healthy diet high in lean protein, whole grain carbohydrates and healthy fats such as olive oil or canola will help mainain a healthy blood sugar   Your blood count is normal.  There is no anemia or abnormalities suggesting infection or other problems.     Please contact the clinic if you have additional questions.  Thank you.     Resulted Orders   HEMOGLOBIN A1C   Result Value Ref Range    Estimated Average Glucose 143 (H) <117 mg/dL    Hemoglobin A1C 6.6 (H) 0.0 - 5.6 %      Comment:      Normal <5.7%   Prediabetes 5.7-6.4%    Diabetes 6.5% or higher     Note: Adopted from ADA consensus guidelines.   CBC with platelets and differential   Result Value Ref Range    WBC Count 6.5 4.0 - 11.0 10e3/uL    RBC Count 4.37 3.80 - 5.20 10e6/uL    Hemoglobin 12.4 11.7 - 15.7 g/dL    Hematocrit 40.0 35.0 - 47.0 %    MCV 92 78 - 100 fL    MCH 28.4 26.5 - 33.0 pg    MCHC 31.0 (L) 31.5 - 36.5 g/dL    RDW 13.0 10.0 - 15.0 %    Platelet Count 281 150 - 450 10e3/uL    % Neutrophils 53 %    % Lymphocytes 38 %    % Monocytes 5 %    % Eosinophils 3 %    % Basophils 1 %    % Immature Granulocytes 1 %    Absolute Neutrophils 3.4 1.6 - 8.3 10e3/uL    Absolute Lymphocytes 2.5 0.8 - 5.3 10e3/uL    Absolute Monocytes 0.3 0.0 - 1.3 10e3/uL    Absolute Eosinophils 0.2 0.0 - 0.7 10e3/uL    Absolute Basophils 0.1 0.0 - 0.2 10e3/uL    Absolute Immature Granulocytes 0.0 <=0.4 10e3/uL       If you have any questions or concerns, please call the clinic at the number listed above.       Sincerely,      LANNY Renee CNP    Electronically signed

## 2025-02-25 LAB
ALBUMIN SERPL BCG-MCNC: 4.1 G/DL (ref 3.5–5.2)
ALP SERPL-CCNC: 66 U/L (ref 40–150)
ALT SERPL W P-5'-P-CCNC: 20 U/L (ref 0–50)
ANION GAP SERPL CALCULATED.3IONS-SCNC: 9 MMOL/L (ref 7–15)
AST SERPL W P-5'-P-CCNC: 30 U/L (ref 0–45)
BILIRUB SERPL-MCNC: 0.3 MG/DL
BUN SERPL-MCNC: 11.2 MG/DL (ref 6–20)
CALCIUM SERPL-MCNC: 9.4 MG/DL (ref 8.8–10.4)
CHLORIDE SERPL-SCNC: 105 MMOL/L (ref 98–107)
CREAT SERPL-MCNC: 0.78 MG/DL (ref 0.51–0.95)
EGFRCR SERPLBLD CKD-EPI 2021: >90 ML/MIN/1.73M2
GLUCOSE SERPL-MCNC: 129 MG/DL (ref 70–99)
HCO3 SERPL-SCNC: 27 MMOL/L (ref 22–29)
POTASSIUM SERPL-SCNC: 4.5 MMOL/L (ref 3.4–5.3)
PROT SERPL-MCNC: 7.3 G/DL (ref 6.4–8.3)
SODIUM SERPL-SCNC: 141 MMOL/L (ref 135–145)

## 2025-04-15 ENCOUNTER — TELEPHONE (OUTPATIENT)
Dept: FAMILY MEDICINE | Facility: CLINIC | Age: 45
End: 2025-04-15
Payer: COMMERCIAL

## 2025-04-15 DIAGNOSIS — Z23 NEED FOR HEPATITIS B VACCINATION: Primary | ICD-10-CM

## 2025-04-23 ENCOUNTER — ALLIED HEALTH/NURSE VISIT (OUTPATIENT)
Dept: INTERNAL MEDICINE | Facility: CLINIC | Age: 45
End: 2025-04-23
Payer: COMMERCIAL

## 2025-04-23 DIAGNOSIS — Z23 NEED FOR VACCINATION: Primary | ICD-10-CM

## 2025-04-23 PROCEDURE — 90471 IMMUNIZATION ADMIN: CPT

## 2025-04-23 PROCEDURE — 99207 PR NO CHARGE NURSE ONLY: CPT

## 2025-04-23 PROCEDURE — 90746 HEPB VACCINE 3 DOSE ADULT IM: CPT

## 2025-05-19 ENCOUNTER — TELEPHONE (OUTPATIENT)
Dept: FAMILY MEDICINE | Facility: CLINIC | Age: 45
End: 2025-05-19
Payer: COMMERCIAL

## 2025-05-19 NOTE — TELEPHONE ENCOUNTER
PRIOR AUTHORIZATION DENIED    Medication: MOUNJARO 2.5 MG/0.5ML SC SOAJ  Insurance Company: [employer] - Phone [phone] Fax [phone]  Denial Date: 5/17/2025  Denial Reason(s): Only covered for type II diabetes with A1C lab of 6.5% or higher      Appeal Information:       Patient Notified: No

## 2025-05-21 NOTE — TELEPHONE ENCOUNTER
MEDICATION APPEAL APPROVED    Medication: MOUNJARO 2.5 MG/0.5ML SC SOAJ  Authorization Effective Date: 4/21/2025  Authorization Expiration Date: 5/21/2026  Approved Dose/Quantity: NA  Reference #:     Appeal Insurance Company: [employer]  Expected CoPay: $       CoPay Card Available:    Financial Assistance Needed: CAROLE  Filling Pharmacy:  [facility]  Patient Notified: Pharmacy will contact when ready  Comments:

## 2025-05-30 ENCOUNTER — ORDERS ONLY (AUTO-RELEASED) (OUTPATIENT)
Dept: FAMILY MEDICINE | Facility: CLINIC | Age: 45
End: 2025-05-30
Payer: COMMERCIAL

## 2025-05-30 DIAGNOSIS — Z12.11 SCREEN FOR COLON CANCER: ICD-10-CM

## 2025-07-16 PROBLEM — E78.5 HYPERLIPIDEMIA LDL GOAL <70: Status: ACTIVE | Noted: 2025-07-16

## 2025-07-21 ENCOUNTER — PATIENT OUTREACH (OUTPATIENT)
Dept: CARE COORDINATION | Facility: CLINIC | Age: 45
End: 2025-07-21
Payer: COMMERCIAL

## 2025-07-26 ENCOUNTER — TELEPHONE (OUTPATIENT)
Dept: FAMILY MEDICINE | Facility: CLINIC | Age: 45
End: 2025-07-26
Payer: COMMERCIAL

## 2025-07-26 DIAGNOSIS — E66.811 CLASS 1 OBESITY WITH SERIOUS COMORBIDITY AND BODY MASS INDEX (BMI) OF 32.0 TO 32.9 IN ADULT, UNSPECIFIED OBESITY TYPE: ICD-10-CM

## 2025-07-26 DIAGNOSIS — Z79.4 TYPE 2 DIABETES MELLITUS WITH DIABETIC MONONEUROPATHY, WITH LONG-TERM CURRENT USE OF INSULIN (H): ICD-10-CM

## 2025-07-26 DIAGNOSIS — E11.41 TYPE 2 DIABETES MELLITUS WITH DIABETIC MONONEUROPATHY, WITH LONG-TERM CURRENT USE OF INSULIN (H): ICD-10-CM

## 2025-07-28 RX ORDER — TIRZEPATIDE 2.5 MG/.5ML
INJECTION, SOLUTION SUBCUTANEOUS
Qty: 2 ML | Refills: 0 | Status: SHIPPED | OUTPATIENT
Start: 2025-07-28

## 2025-07-28 NOTE — TELEPHONE ENCOUNTER
Please check if the patient has experienced any side effects with the medication. Additionally, inquire if she has lost weight

## 2025-07-29 NOTE — TELEPHONE ENCOUNTER
Attempt #1 to call patient.     RN left voicemail and requested return call to [facility] at [phone].      BRANDY Carolina  [facility]

## 2025-07-30 NOTE — TELEPHONE ENCOUNTER
Attempt #2 to call patient.     RN left voicemail and requested return call to [facility] at [phone]. Sent pt a my chart message as well.     DEYANIRA Zuniga  [place] [facility] RN  [facility]

## 2025-07-31 ENCOUNTER — TELEPHONE (OUTPATIENT)
Dept: FAMILY MEDICINE | Facility: CLINIC | Age: 45
End: 2025-07-31
Payer: COMMERCIAL

## 2025-07-31 DIAGNOSIS — Z79.4 TYPE 2 DIABETES MELLITUS WITH DIABETIC MONONEUROPATHY, WITH LONG-TERM CURRENT USE OF INSULIN (H): Primary | ICD-10-CM

## 2025-07-31 DIAGNOSIS — E11.41 TYPE 2 DIABETES MELLITUS WITH DIABETIC MONONEUROPATHY, WITH LONG-TERM CURRENT USE OF INSULIN (H): Primary | ICD-10-CM

## 2025-07-31 DIAGNOSIS — Z23 NEED FOR HEPATITIS B VACCINATION: ICD-10-CM

## 2025-08-01 ENCOUNTER — ORDERS ONLY (AUTO-RELEASED) (OUTPATIENT)
Dept: FAMILY MEDICINE | Facility: CLINIC | Age: 45
End: 2025-08-01
Payer: COMMERCIAL

## 2025-08-31 ENCOUNTER — HEALTH MAINTENANCE LETTER (OUTPATIENT)
Age: 45
End: 2025-08-31